# Patient Record
Sex: MALE | Race: WHITE | NOT HISPANIC OR LATINO | RURAL
[De-identification: names, ages, dates, MRNs, and addresses within clinical notes are randomized per-mention and may not be internally consistent; named-entity substitution may affect disease eponyms.]

---

## 2017-02-12 ENCOUNTER — OFFICE VISIT (OUTPATIENT)
Dept: RETAIL CLINIC | Facility: CLINIC | Age: 32
End: 2017-02-12

## 2017-02-12 VITALS
HEIGHT: 71 IN | BODY MASS INDEX: 34.3 KG/M2 | TEMPERATURE: 99.1 F | OXYGEN SATURATION: 98 % | HEART RATE: 96 BPM | WEIGHT: 245 LBS | RESPIRATION RATE: 18 BRPM

## 2017-02-12 DIAGNOSIS — R05.9 COUGH: ICD-10-CM

## 2017-02-12 DIAGNOSIS — R68.89 FLU-LIKE SYMPTOMS: ICD-10-CM

## 2017-02-12 DIAGNOSIS — J01.00 ACUTE MAXILLARY SINUSITIS, RECURRENCE NOT SPECIFIED: Primary | ICD-10-CM

## 2017-02-12 LAB
EXPIRATION DATE: NORMAL
FLUAV AG NPH QL: NORMAL
FLUBV AG NPH QL: NORMAL
INTERNAL CONTROL: NORMAL
Lab: NORMAL

## 2017-02-12 PROCEDURE — 99213 OFFICE O/P EST LOW 20 MIN: CPT | Performed by: NURSE PRACTITIONER

## 2017-02-12 PROCEDURE — 87804 INFLUENZA ASSAY W/OPTIC: CPT | Performed by: NURSE PRACTITIONER

## 2017-02-12 RX ORDER — AMOXICILLIN AND CLAVULANATE POTASSIUM 875; 125 MG/1; MG/1
1 TABLET, FILM COATED ORAL 2 TIMES DAILY
Qty: 20 TABLET | Refills: 0 | Status: SHIPPED | OUTPATIENT
Start: 2017-02-12 | End: 2017-02-22

## 2017-02-12 RX ORDER — DEXTROMETHORPHAN HYDROBROMIDE AND PROMETHAZINE HYDROCHLORIDE 15; 6.25 MG/5ML; MG/5ML
5 SYRUP ORAL 4 TIMES DAILY PRN
Qty: 240 ML | Refills: 0 | Status: SHIPPED | OUTPATIENT
Start: 2017-02-12 | End: 2018-01-30 | Stop reason: SDUPTHER

## 2017-02-12 NOTE — PROGRESS NOTES
Subjective   Lele Yu is a 31 y.o. male.     Sinus Problem   This is a new problem. The current episode started in the past 7 days. The problem has been gradually worsening since onset. There has been no fever. The pain is moderate. Associated symptoms include chills, congestion, coughing (persistent), headaches, a hoarse voice, sinus pressure and swollen glands. Pertinent negatives include no ear pain, neck pain, shortness of breath, sneezing or sore throat. Past treatments include acetaminophen. The treatment provided no relief.   Flu Symptoms   This is a new problem. The current episode started yesterday. The problem occurs constantly. The problem has been gradually worsening. Associated symptoms include anorexia, chills, congestion, coughing (persistent), headaches, myalgias, nausea and swollen glands. Pertinent negatives include no abdominal pain, chest pain, fatigue, fever, neck pain, rash, sore throat or vomiting. The symptoms are aggravated by coughing. He has tried NSAIDs and acetaminophen for the symptoms. The treatment provided no relief.        The following portions of the patient's history were reviewed and updated as appropriate: allergies, current medications, past family history, past medical history, past social history, past surgical history and problem list.    Review of Systems   Constitutional: Positive for appetite change and chills. Negative for fatigue and fever.   HENT: Positive for congestion, hoarse voice, postnasal drip, rhinorrhea and sinus pressure. Negative for ear pain, sneezing, sore throat and trouble swallowing.    Eyes: Negative.    Respiratory: Positive for cough (persistent). Negative for shortness of breath and wheezing.    Cardiovascular: Negative.  Negative for chest pain.   Gastrointestinal: Positive for anorexia and nausea. Negative for abdominal pain, diarrhea and vomiting.   Musculoskeletal: Positive for myalgias. Negative for neck pain.   Skin: Negative.  Negative  "for rash.   Neurological: Positive for headaches. Negative for dizziness.        Visit Vitals   • Pulse 96   • Temp 99.1 °F (37.3 °C)   • Resp 18   • Ht 71\" (180.3 cm)   • Wt 245 lb (111 kg)   • SpO2 98%   • BMI 34.17 kg/m2        Objective   Physical Exam   Constitutional: Vital signs are normal. He appears well-developed and well-nourished.   HENT:   Head: Normocephalic.   Right Ear: External ear and ear canal normal. No drainage, swelling or tenderness. Tympanic membrane is bulging. Tympanic membrane is not erythematous.   Left Ear: External ear and ear canal normal. No drainage, swelling or tenderness. Tympanic membrane is bulging. Tympanic membrane is not erythematous.   Nose: Mucosal edema and rhinorrhea present. Right sinus exhibits maxillary sinus tenderness and frontal sinus tenderness. Left sinus exhibits maxillary sinus tenderness and frontal sinus tenderness.   Mouth/Throat: Uvula is midline, oropharynx is clear and moist and mucous membranes are normal. Tonsils are 0 on the right. Tonsils are 0 on the left. No tonsillar exudate.   Eyes: Conjunctivae and EOM are normal. Pupils are equal, round, and reactive to light. Right eye exhibits no discharge. Left eye exhibits no discharge.   Neck: Normal range of motion. Neck supple.   Cardiovascular: Normal rate, regular rhythm, S1 normal, S2 normal and normal heart sounds.    Pulmonary/Chest: Effort normal and breath sounds normal. No respiratory distress. He has no wheezes.   Abdominal: Soft. Normal appearance. Bowel sounds are increased. There is no tenderness. There is no rebound and no guarding.   Lymphadenopathy:        Head (right side): Tonsillar adenopathy present.        Head (left side): Tonsillar adenopathy present.     He has no cervical adenopathy.   Skin: Skin is warm, dry and intact. No rash noted. He is not diaphoretic.   Psychiatric: He has a normal mood and affect. His speech is normal and behavior is normal. Thought content normal.   Vitals " reviewed.       Results for orders placed or performed in visit on 02/12/17   POC Influenza A / B   Result Value Ref Range    Rapid Influenza A Ag neg     Rapid Influenza B Ag neg     Internal Control Passed Passed    Lot Number [id number]     Expiration Date 6/2018         Assessment/Plan   Lele was seen today for sinus problem and flu symptoms.    Diagnoses and all orders for this visit:    Acute maxillary sinusitis, recurrence not specified  -     amoxicillin-clavulanate (AUGMENTIN) 875-125 MG per tablet; Take 1 tablet by mouth 2 (Two) Times a Day for 10 days.    Flu-like symptoms  -     POC Influenza A / B    Cough  -     promethazine-dextromethorphan (PROMETHAZINE-DM) 6.25-15 MG/5ML syrup; Take 5 mL by mouth 4 (Four) Times a Day As Needed for cough.

## 2017-02-12 NOTE — PATIENT INSTRUCTIONS
Sinusitis, Adult  Sinusitis is redness, soreness, and inflammation of the paranasal sinuses. Paranasal sinuses are air pockets within the bones of your face. They are located beneath your eyes, in the middle of your forehead, and above your eyes. In healthy paranasal sinuses, mucus is able to drain out, and air is able to circulate through them by way of your nose. However, when your paranasal sinuses are inflamed, mucus and air can become trapped. This can allow bacteria and other germs to grow and cause infection.  Sinusitis can develop quickly and last only a short time (acute) or continue over a long period (chronic). Sinusitis that lasts for more than 12 weeks is considered chronic.  CAUSES  Causes of sinusitis include:  · Allergies.  · Structural abnormalities, such as displacement of the cartilage that separates your nostrils (deviated septum), which can decrease the air flow through your nose and sinuses and affect sinus drainage.  · Functional abnormalities, such as when the small hairs (cilia) that line your sinuses and help remove mucus do not work properly or are not present.  SIGNS AND SYMPTOMS  Symptoms of acute and chronic sinusitis are the same. The primary symptoms are pain and pressure around the affected sinuses. Other symptoms include:  · Upper toothache.  · Earache.  · Headache.  · Bad breath.  · Decreased sense of smell and taste.  · A cough, which worsens when you are lying flat.  · Fatigue.  · Fever.  · Thick drainage from your nose, which often is green and may contain pus (purulent).  · Swelling and warmth over the affected sinuses.  DIAGNOSIS  Your health care provider will perform a physical exam. During your exam, your health care provider may perform any of the following to help determine if you have acute sinusitis or chronic sinusitis:  · Look in your nose for signs of abnormal growths in your nostrils (nasal polyps).  · Tap over the affected sinus to check for signs of  infection.  · View the inside of your sinuses using an imaging device that has a light attached (endoscope).  If your health care provider suspects that you have chronic sinusitis, one or more of the following tests may be recommended:  · Allergy tests.  · Nasal culture. A sample of mucus is taken from your nose, sent to a lab, and screened for bacteria.  · Nasal cytology. A sample of mucus is taken from your nose and examined by your health care provider to determine if your sinusitis is related to an allergy.  TREATMENT  Most cases of acute sinusitis are related to a viral infection and will resolve on their own within 10 days. Sometimes, medicines are prescribed to help relieve symptoms of both acute and chronic sinusitis. These may include pain medicines, decongestants, nasal steroid sprays, or saline sprays.  However, for sinusitis related to a bacterial infection, your health care provider will prescribe antibiotic medicines. These are medicines that will help kill the bacteria causing the infection.  Rarely, sinusitis is caused by a fungal infection. In these cases, your health care provider will prescribe antifungal medicine.  For some cases of chronic sinusitis, surgery is needed. Generally, these are cases in which sinusitis recurs more than 3 times per year, despite other treatments.  HOME CARE INSTRUCTIONS  · Drink plenty of water. Water helps thin the mucus so your sinuses can drain more easily.  · Use a humidifier.  · Inhale steam 3-4 times a day (for example, sit in the bathroom with the shower running).  · Apply a warm, moist washcloth to your face 3-4 times a day, or as directed by your health care provider.  · Use saline nasal sprays to help moisten and clean your sinuses.  · Take medicines only as directed by your health care provider.  · If you were prescribed either an antibiotic or antifungal medicine, finish it all even if you start to feel better.  SEEK IMMEDIATE MEDICAL CARE IF:  · You have  "increasing pain or severe headaches.  · You have nausea, vomiting, or drowsiness.  · You have swelling around your face.  · You have vision problems.  · You have a stiff neck.  · You have difficulty breathing.     This information is not intended to replace advice given to you by your health care provider. Make sure you discuss any questions you have with your health care provider.     Document Released: 12/18/2006 Document Revised: 01/08/2016 Document Reviewed: 01/01/2013  [employer] Interactive Patient Education ©2016 [employer]  Viral Infections  A viral infection can be caused by different types of viruses. Most viral infections are not serious and resolve on their own. However, some infections may cause severe symptoms and may lead to further complications.  SYMPTOMS  Viruses can frequently cause:  · Minor sore throat.  · Aches and pains.  · Headaches.  · Runny nose.  · Different types of rashes.  · Watery eyes.  · Tiredness.  · Cough.  · Loss of appetite.  · Gastrointestinal infections, resulting in nausea, vomiting, and diarrhea.  These symptoms do not respond to antibiotics because the infection is not caused by bacteria. However, you might catch a bacterial infection following the viral infection. This is sometimes called a \"superinfection.\" Symptoms of such a bacterial infection may include:  · Worsening sore throat with pus and difficulty swallowing.  · Swollen neck glands.  · Chills and a high or persistent fever.  · Severe headache.  · Tenderness over the sinuses.  · Persistent overall ill feeling (malaise), muscle aches, and tiredness (fatigue).  · Persistent cough.  · Yellow, green, or brown mucus production with coughing.  HOME CARE INSTRUCTIONS   · Only take over-the-counter or prescription medicines for pain, discomfort, diarrhea, or fever as directed by your caregiver.  · Drink enough water and fluids to keep your urine clear or pale yellow. Sports drinks can provide valuable electrolytes, " sugars, and hydration.  · Get plenty of rest and maintain proper nutrition. Soups and broths with crackers or rice are fine.  SEEK IMMEDIATE MEDICAL CARE IF:   · You have severe headaches, shortness of breath, chest pain, neck pain, or an unusual rash.  · You have uncontrolled vomiting, diarrhea, or you are unable to keep down fluids.  · You or your child has an oral temperature above 102° F (38.9° C), not controlled by medicine.  · Your baby is older than 3 months with a rectal temperature of 102° F (38.9° C) or higher.  · Your baby is 3 months old or younger with a rectal temperature of 100.4° F (38° C) or higher.  MAKE SURE YOU:   · Understand these instructions.  · Will watch your condition.  · Will get help right away if you are not doing well or get worse.     This information is not intended to replace advice given to you by your health care provider. Make sure you discuss any questions you have with your health care provider.     Document Released: 09/27/2006 Document Revised: 03/11/2013 Document Reviewed: 05/25/2016  [employer] Interactive Patient Education ©2016 [employer]

## 2018-01-30 ENCOUNTER — OFFICE VISIT (OUTPATIENT)
Dept: RETAIL CLINIC | Facility: CLINIC | Age: 33
End: 2018-01-30

## 2018-01-30 VITALS
TEMPERATURE: 98.1 F | BODY MASS INDEX: 34.07 KG/M2 | HEART RATE: 101 BPM | RESPIRATION RATE: 20 BRPM | WEIGHT: 238 LBS | OXYGEN SATURATION: 98 % | HEIGHT: 70 IN

## 2018-01-30 DIAGNOSIS — J00 COMMON COLD: ICD-10-CM

## 2018-01-30 DIAGNOSIS — R05.9 COUGH: ICD-10-CM

## 2018-01-30 DIAGNOSIS — J01.01 ACUTE RECURRENT MAXILLARY SINUSITIS: Primary | ICD-10-CM

## 2018-01-30 PROCEDURE — 99213 OFFICE O/P EST LOW 20 MIN: CPT | Performed by: NURSE PRACTITIONER

## 2018-01-30 PROCEDURE — 87804 INFLUENZA ASSAY W/OPTIC: CPT | Performed by: NURSE PRACTITIONER

## 2018-01-30 RX ORDER — AMOXICILLIN AND CLAVULANATE POTASSIUM 875; 125 MG/1; MG/1
1 TABLET, FILM COATED ORAL EVERY 12 HOURS SCHEDULED
Qty: 20 TABLET | Refills: 0 | Status: SHIPPED | OUTPATIENT
Start: 2018-01-30 | End: 2018-02-09

## 2018-01-30 RX ORDER — PREDNISONE 10 MG/1
TABLET ORAL DAILY
Qty: 21 EACH | Refills: 0 | Status: SHIPPED | OUTPATIENT
Start: 2018-01-30 | End: 2018-02-05

## 2018-01-30 RX ORDER — DEXTROMETHORPHAN HYDROBROMIDE AND PROMETHAZINE HYDROCHLORIDE 15; 6.25 MG/5ML; MG/5ML
5 SYRUP ORAL 4 TIMES DAILY PRN
Qty: 240 ML | Refills: 0 | Status: SHIPPED | OUTPATIENT
Start: 2018-01-30 | End: 2020-02-08

## 2018-01-30 RX ORDER — PSEUDOEPHEDRINE HCL 120 MG/1
120 TABLET, FILM COATED, EXTENDED RELEASE ORAL EVERY 12 HOURS
Qty: 20 TABLET | Refills: 0 | Status: SHIPPED | OUTPATIENT
Start: 2018-01-30 | End: 2018-02-09

## 2018-01-30 RX ORDER — IBUPROFEN 800 MG/1
800 TABLET ORAL EVERY 8 HOURS PRN
Qty: 90 TABLET | Refills: 0 | Status: SHIPPED | OUTPATIENT
Start: 2018-01-30 | End: 2020-02-08

## 2018-01-30 NOTE — PROGRESS NOTES
"Fina Yu is a 32 y.o. male.     Flu Symptoms   This is a new problem. The current episode started yesterday. The problem occurs constantly. The problem has been rapidly worsening. Associated symptoms include chills, congestion, coughing, fatigue, a fever, headaches (severe), myalgias, neck pain, a sore throat and swollen glands. Pertinent negatives include no arthralgias, nausea or vomiting. The symptoms are aggravated by coughing. He has tried NSAIDs for the symptoms. The treatment provided mild relief.   Sinus Problem   This is a recurrent problem. The current episode started in the past 7 days. The problem has been gradually worsening since onset. The maximum temperature recorded prior to his arrival was 100.4 - 100.9 F. The pain is severe. Associated symptoms include chills, congestion, coughing, headaches (severe), a hoarse voice, neck pain, sinus pressure, a sore throat and swollen glands. Pertinent negatives include no shortness of breath. Past treatments include nothing. The treatment provided no relief.        The following portions of the patient's history were reviewed and updated as appropriate: allergies, current medications, past medical history, past social history, past surgical history and problem list.    Review of Systems   Constitutional: Positive for appetite change, chills, fatigue and fever.   HENT: Positive for congestion, hoarse voice, postnasal drip, rhinorrhea, sinus pain, sinus pressure and sore throat.    Eyes: Negative.    Respiratory: Positive for cough. Negative for chest tightness, shortness of breath and wheezing.    Cardiovascular: Negative.    Gastrointestinal: Negative for diarrhea, nausea and vomiting.   Musculoskeletal: Positive for myalgias and neck pain. Negative for arthralgias.   Skin: Negative.    Neurological: Positive for headaches (severe).   Hematological: Positive for adenopathy.        Pulse 101  Temp 98.1 °F (36.7 °C)  Resp 20  Ht 177.8 cm (70\") "  Wt 108 kg (238 lb)  SpO2 98%  BMI 34.15 kg/m2     Objective   Physical Exam   Constitutional: He is oriented to person, place, and time. Vital signs are normal. He appears well-developed and well-nourished.   HENT:   Head: Normocephalic.   Right Ear: External ear and ear canal normal. No drainage, swelling or tenderness. Tympanic membrane is bulging. Tympanic membrane is not erythematous.   Left Ear: External ear and ear canal normal. No drainage, swelling or tenderness. Tympanic membrane is bulging. Tympanic membrane is not erythematous.   Nose: Mucosal edema and rhinorrhea present. Right sinus exhibits maxillary sinus tenderness and frontal sinus tenderness. Left sinus exhibits maxillary sinus tenderness and frontal sinus tenderness.   Mouth/Throat: Uvula is midline, oropharynx is clear and moist and mucous membranes are normal. Tonsils are 0 on the right. Tonsils are 0 on the left. No tonsillar exudate.   Eyes: Conjunctivae and EOM are normal. Pupils are equal, round, and reactive to light. Right eye exhibits no discharge. Left eye exhibits no discharge.   Neck: Normal range of motion. Neck supple.   Cardiovascular: Normal rate, regular rhythm, S1 normal, S2 normal and normal heart sounds.    Pulmonary/Chest: Effort normal and breath sounds normal. No respiratory distress. He has no wheezes.   Lymphadenopathy:        Head (right side): Tonsillar adenopathy present.        Head (left side): Tonsillar adenopathy present.     He has no cervical adenopathy.   Neurological: He is alert and oriented to person, place, and time.   Skin: Skin is warm, dry and intact. No rash noted. He is not diaphoretic.   Psychiatric: He has a normal mood and affect. His speech is normal and behavior is normal. Thought content normal.   Vitals reviewed.       Results for orders placed or performed in visit on 01/30/18   POC Influenza A / B   Result Value Ref Range    Rapid Influenza A Ag neg     Rapid Influenza B Ag neg     Internal  Control Passed Passed    Lot Number [id number]     Expiration Date 12/2019         Assessment/Plan   Lele was seen today for flu symptoms and sinus problem.    Diagnoses and all orders for this visit:    Acute recurrent maxillary sinusitis  -     amoxicillin-clavulanate (AUGMENTIN) 875-125 MG per tablet; Take 1 tablet by mouth Every 12 (Twelve) Hours for 10 days.  -     pseudoephedrine (SUDAFED) 120 MG 12 hr tablet; Take 1 tablet by mouth Every 12 (Twelve) Hours for 10 days.  -     PredniSONE (DELTASONE) 10 MG (21) tablet pack; Take  by mouth Daily for 6 days. Use as directed on package    Common cold  -     POC Influenza A / B  -     ibuprofen (ADVIL,MOTRIN) 800 MG tablet; Take 1 tablet by mouth Every 8 (Eight) Hours As Needed for Mild Pain .    Cough  -     promethazine-dextromethorphan (PROMETHAZINE-DM) 6.25-15 MG/5ML syrup; Take 5 mL by mouth 4 (Four) Times a Day As Needed for Cough.

## 2018-01-30 NOTE — PATIENT INSTRUCTIONS
Sinusitis, Adult  Sinusitis is soreness and inflammation of your sinuses. Sinuses are hollow spaces in the bones around your face. Your sinuses are located:  · Around your eyes.  · In the middle of your forehead.  · Behind your nose.  · In your cheekbones.  Your sinuses and nasal passages are lined with a stringy fluid (mucus). Mucus normally drains out of your sinuses. When your nasal tissues become inflamed or swollen, the mucus can become trapped or blocked so air cannot flow through your sinuses. This allows bacteria, viruses, and funguses to grow, which leads to infection.  Sinusitis can develop quickly and last for 7?10 days (acute) or for more than 12 weeks (chronic). Sinusitis often develops after a cold.  What are the causes?  This condition is caused by anything that creates swelling in the sinuses or stops mucus from draining, including:  · Allergies.  · Asthma.  · Bacterial or viral infection.  · Abnormally shaped bones between the nasal passages.  · Nasal growths that contain mucus (nasal polyps).  · Narrow sinus openings.  · Pollutants, such as chemicals or irritants in the air.  · A foreign object stuck in the nose.  · A fungal infection. This is rare.  What increases the risk?  The following factors may make you more likely to develop this condition:  · Having allergies or asthma.  · Having had a recent cold or respiratory tract infection.  · Having structural deformities or blockages in your nose or sinuses.  · Having a weak immune system.  · Doing a lot of swimming or diving.  · Overusing nasal sprays.  · Smoking.  What are the signs or symptoms?  The main symptoms of this condition are pain and a feeling of pressure around the affected sinuses. Other symptoms include:  · Upper toothache.  · Earache.  · Headache.  · Bad breath.  · Decreased sense of smell and taste.  · A cough that may get worse at night.  · Fatigue.  · Fever.  · Thick drainage from your nose. The drainage is often green and it may  contain pus (purulent).  · Stuffy nose or congestion.  · Postnasal drip. This is when extra mucus collects in the throat or back of the nose.  · Swelling and warmth over the affected sinuses.  · Sore throat.  · Sensitivity to light.  How is this diagnosed?  This condition is diagnosed based on symptoms, a medical history, and a physical exam. To find out if your condition is acute or chronic, your health care provider may:  · Look in your nose for signs of nasal polyps.  · Tap over the affected sinus to check for signs of infection.  · View the inside of your sinuses using an imaging device that has a light attached (endoscope).  If your health care provider suspects that you have chronic sinusitis, you may also:  · Be tested for allergies.  · Have a sample of mucus taken from your nose (nasal culture) and checked for bacteria.  · Have a mucus sample examined to see if your sinusitis is related to an allergy.  If your sinusitis does not respond to treatment and it lasts longer than 8 weeks, you may have an MRI or CT scan to check your sinuses. These scans also help to determine how severe your infection is.  In rare cases, a bone biopsy may be done to rule out more serious types of fungal sinus disease.  How is this treated?  Treatment for sinusitis depends on the cause and whether your condition is chronic or acute. If a virus is causing your sinusitis, your symptoms will go away on their own within 10 days. You may be given medicines to relieve your symptoms, including:  · Topical nasal decongestants. They shrink swollen nasal passages and let mucus drain from your sinuses.  · Antihistamines. These drugs block inflammation that is triggered by allergies. This can help to ease swelling in your nose and sinuses.  · Topical nasal corticosteroids. These are nasal sprays that ease inflammation and swelling in your nose and sinuses.  · Nasal saline washes. These rinses can help to get rid of thick mucus in your  nose.  If your condition is caused by bacteria, you will be given an antibiotic medicine. If your condition is caused by a fungus, you will be given an antifungal medicine.  Surgery may be needed to correct underlying conditions, such as narrow nasal passages. Surgery may also be needed to remove polyps.  Follow these instructions at home:  Medicines  · Take, use, or apply over-the-counter and prescription medicines only as told by your health care provider. These may include nasal sprays.  · If you were prescribed an antibiotic medicine, take it as told by your health care provider. Do not stop taking the antibiotic even if you start to feel better.  Hydrate and Humidify  · Drink enough water to keep your urine clear or pale yellow. Staying hydrated will help to thin your mucus.  · Use a cool mist humidifier to keep the humidity level in your home above 50%.  · Inhale steam for 10-15 minutes, 3-4 times a day or as told by your health care provider. You can do this in the bathroom while a hot shower is running.  · Limit your exposure to cool or dry air.  Rest  · Rest as much as possible.  · Sleep with your head raised (elevated).  · Make sure to get enough sleep each night.  General instructions  · Apply a warm, moist washcloth to your face 3-4 times a day or as told by your health care provider. This will help with discomfort.  · Wash your hands often with soap and water to reduce your exposure to viruses and other germs. If soap and water are not available, use hand .  · Do not smoke. Avoid being around people who are smoking (secondhand smoke).  · Keep all follow-up visits as told by your health care provider. This is important.  Contact a health care provider if:  · You have a fever.  · Your symptoms get worse.  · Your symptoms do not improve within 10 days.  Get help right away if:  · You have a severe headache.  · You have persistent vomiting.  · You have pain or swelling around your face or  eyes.  · You have vision problems.  · You develop confusion.  · Your neck is stiff.  · You have trouble breathing.  This information is not intended to replace advice given to you by your health care provider. Make sure you discuss any questions you have with your health care provider.  Document Released: 12/18/2006 Document Revised: 08/13/2017 Document Reviewed: 10/12/2016  [employer] Interactive Patient Education © 2017 [employer].    Viral Respiratory Infection  A respiratory infection is an illness that affects part of the respiratory system, such as the lungs, nose, or throat. Most respiratory infections are caused by either viruses or bacteria. A respiratory infection that is caused by a virus is called a viral respiratory infection.  Common types of viral respiratory infections include:  · A cold.  · The flu (influenza).  · A respiratory syncytial virus (RSV) infection.  How do I know if I have a viral respiratory infection?  Most viral respiratory infections cause:  · A stuffy or runny nose.  · Yellow or green nasal discharge.  · A cough.  · Sneezing.  · Fatigue.  · Achy muscles.  · A sore throat.  · Sweating or chills.  · A fever.  · A headache.  How are viral respiratory infections treated?  If influenza is diagnosed early, it may be treated with an antiviral medicine that shortens the length of time a person has symptoms. Symptoms of viral respiratory infections may be treated with over-the-counter and prescription medicines, such as:  · Expectorants. These make it easier to cough up mucus.  · Decongestant nasal sprays.  Health care providers do not prescribe antibiotic medicines for viral infections. This is because antibiotics are designed to kill bacteria. They have no effect on viruses.  How do I know if I should stay home from work or school?  To avoid exposing others to your respiratory infection, stay home if you have:  · A fever.  · A persistent cough.  · A sore throat.  · A runny  nose.  · Sneezing.  · Muscles aches.  · Headaches.  · Fatigue.  · Weakness.  · Chills.  · Sweating.  · Nausea.  Follow these instructions at home:  · Rest as much as possible.  · Take over-the-counter and prescription medicines only as told by your health care provider.  · Drink enough fluid to keep your urine clear or pale yellow. This helps prevent dehydration and helps loosen up mucus.  · Gargle with a salt-water mixture 3-4 times per day or as needed. To make a salt-water mixture, completely dissolve ½-1 tsp of salt in 1 cup of warm water.  · Use nose drops made from salt water to ease congestion and soften raw skin around your nose.  · Do not drink alcohol.  · Do not use tobacco products, including cigarettes, chewing tobacco, and e-cigarettes. If you need help quitting, ask your health care provider.  Contact a health care provider if:  · Your symptoms last for 10 days or longer.  · Your symptoms get worse over time.  · You have a fever.  · You have severe sinus pain in your face or forehead.  · The glands in your jaw or neck become very swollen.  Get help right away if:  · You feel pain or pressure in your chest.  · You have shortness of breath.  · You faint or feel like you will faint.  · You have severe and persistent vomiting.  · You feel confused or disoriented.  This information is not intended to replace advice given to you by your health care provider. Make sure you discuss any questions you have with your health care provider.  Document Released: 09/27/2006 Document Revised: 05/25/2017 Document Reviewed: 05/25/2016  [employer] Interactive Patient Education © 2017 [employer]

## 2020-02-08 ENCOUNTER — OFFICE VISIT (OUTPATIENT)
Dept: RETAIL CLINIC | Facility: CLINIC | Age: 35
End: 2020-02-08

## 2020-02-08 VITALS
DIASTOLIC BLOOD PRESSURE: 76 MMHG | SYSTOLIC BLOOD PRESSURE: 118 MMHG | TEMPERATURE: 101.9 F | HEIGHT: 70 IN | WEIGHT: 257 LBS | HEART RATE: 129 BPM | BODY MASS INDEX: 36.79 KG/M2 | OXYGEN SATURATION: 94 % | RESPIRATION RATE: 16 BRPM

## 2020-02-08 DIAGNOSIS — J11.1 INFLUENZA: Primary | ICD-10-CM

## 2020-02-08 DIAGNOSIS — J45.901 INTRINSIC ASTHMA WITH EXACERBATION, UNSPECIFIED ASTHMA SEVERITY: ICD-10-CM

## 2020-02-08 LAB
EXPIRATION DATE: NORMAL
FLUAV AG NPH QL: NEGATIVE
FLUBV AG NPH QL: NEGATIVE
INTERNAL CONTROL: NORMAL
Lab: NORMAL

## 2020-02-08 PROCEDURE — 99213 OFFICE O/P EST LOW 20 MIN: CPT | Performed by: NURSE PRACTITIONER

## 2020-02-08 PROCEDURE — 94640 AIRWAY INHALATION TREATMENT: CPT | Performed by: NURSE PRACTITIONER

## 2020-02-08 PROCEDURE — 87804 INFLUENZA ASSAY W/OPTIC: CPT | Performed by: NURSE PRACTITIONER

## 2020-02-08 RX ORDER — ONDANSETRON 4 MG/1
4 TABLET, ORALLY DISINTEGRATING ORAL EVERY 6 HOURS PRN
Qty: 8 TABLET | Refills: 0 | Status: SHIPPED | OUTPATIENT
Start: 2020-02-08

## 2020-02-08 RX ORDER — IPRATROPIUM BROMIDE AND ALBUTEROL SULFATE 2.5; .5 MG/3ML; MG/3ML
3 SOLUTION RESPIRATORY (INHALATION) ONCE
Status: COMPLETED | OUTPATIENT
Start: 2020-02-08 | End: 2020-02-08

## 2020-02-08 RX ORDER — ALBUTEROL SULFATE 90 UG/1
2 AEROSOL, METERED RESPIRATORY (INHALATION) EVERY 4 HOURS PRN
Qty: 1 INHALER | Refills: 0 | Status: SHIPPED | OUTPATIENT
Start: 2020-02-08

## 2020-02-08 RX ORDER — DEXTROMETHORPHAN HYDROBROMIDE AND PROMETHAZINE HYDROCHLORIDE 15; 6.25 MG/5ML; MG/5ML
5 SYRUP ORAL NIGHTLY PRN
Qty: 180 ML | Refills: 0 | Status: SHIPPED | OUTPATIENT
Start: 2020-02-08

## 2020-02-08 RX ORDER — PREDNISONE 20 MG/1
TABLET ORAL
Qty: 4 TABLET | Refills: 0 | Status: SHIPPED | OUTPATIENT
Start: 2020-02-08

## 2020-02-08 RX ADMIN — IPRATROPIUM BROMIDE AND ALBUTEROL SULFATE 3 ML: 2.5; .5 SOLUTION RESPIRATORY (INHALATION) at 09:35

## 2020-02-08 NOTE — PATIENT INSTRUCTIONS
"Influenza, Adult  Influenza is also called \"the flu.\" It is an infection in the lungs, nose, and throat (respiratory tract). It is caused by a virus. The flu causes symptoms that are similar to symptoms of a cold. It also causes a high fever and body aches.  The flu spreads easily from person to person (is contagious). Getting a flu shot (influenza vaccination) every year is the best way to prevent the flu.  What are the causes?  This condition is caused by the influenza virus. You can get the virus by:  · Breathing in droplets that are in the air from the cough or sneeze of a person who has the virus.  · Touching something that has the virus on it (is contaminated) and then touching your mouth, nose, or eyes.  What increases the risk?  Certain things may make you more likely to get the flu. These include:  · Not washing your hands often.  · Having close contact with many people during cold and flu season.  · Touching your mouth, eyes, or nose without first washing your hands.  · Not getting a flu shot every year.  You may have a higher risk for the flu, along with serious problems such as a lung infection (pneumonia), if you:  · Are older than 65.  · Are pregnant.  · Have a weakened disease-fighting system (immune system) because of a disease or taking certain medicines.  · Have a long-term (chronic) illness, such as:  ? Heart, kidney, or lung disease.  ? Diabetes.  ? Asthma.  · Have a liver disorder.  · Are very overweight (morbidly obese).  · Have anemia. This is a condition that affects your red blood cells.  What are the signs or symptoms?  Symptoms usually begin suddenly and last 4-14 days. They may include:  · Fever and chills.  · Headaches, body aches, or muscle aches.  · Sore throat.  · Cough.  · Runny or stuffy (congested) nose.  · Chest discomfort.  · Not wanting to eat as much as normal (poor appetite).  · Weakness or feeling tired (fatigue).  · Dizziness.  · Feeling sick to your stomach (nauseous) or " throwing up (vomiting).  How is this treated?  If the flu is found early, you can be treated with medicine that can help reduce how bad the illness is and how long it lasts (antiviral medicine). This may be given by mouth (orally) or through an IV tube.  Taking care of yourself at home can help your symptoms get better. Your doctor may suggest:  · Taking over-the-counter medicines.  · Drinking plenty of fluids.  The flu often goes away on its own. If you have very bad symptoms or other problems, you may be treated in a hospital.  Follow these instructions at home:         Activity  · Rest as needed. Get plenty of sleep.  · Stay home from work or school as told by your doctor.  ? Do not leave home until you do not have a fever for 24 hours without taking medicine.  ? Leave home only to visit your doctor.  Eating and drinking  · Take an ORS (oral rehydration solution). This is a drink that is sold at pharmacies and stores.  · Drink enough fluid to keep your pee (urine) pale yellow.  · Drink clear fluids in small amounts as you are able. Clear fluids include:  ? Water.  ? Ice chips.  ? Fruit juice that has water added (diluted fruit juice).  ? Low-calorie sports drinks.  · Eat bland, easy-to-digest foods in small amounts as you are able. These foods include:  ? Bananas.  ? Applesauce.  ? Rice.  ? Lean meats.  ? Toast.  ? Crackers.  · Do not eat or drink:  ? Fluids that have a lot of sugar or caffeine.  ? Alcohol.  ? Spicy or fatty foods.  General instructions  · Take over-the-counter and prescription medicines only as told by your doctor.  · Use a cool mist humidifier to add moisture to the air in your home. This can make it easier for you to breathe.  · Cover your mouth and nose when you cough or sneeze.  · Wash your hands with soap and water often, especially after you cough or sneeze. If you cannot use soap and water, use alcohol-based hand .  · Keep all follow-up visits as told by your doctor. This is  "important.  How is this prevented?    · Get a flu shot every year. You may get the flu shot in late summer, fall, or winter. Ask your doctor when you should get your flu shot.  · Avoid contact with people who are sick during fall and winter (cold and flu season).  Contact a doctor if:  · You get new symptoms.  · You have:  ? Chest pain.  ? Watery poop (diarrhea).  ? A fever.  · Your cough gets worse.  · You start to have more mucus.  · You feel sick to your stomach.  · You throw up.  Get help right away if you:  · Have shortness of breath.  · Have trouble breathing.  · Have skin or nails that turn a bluish color.  · Have very bad pain or stiffness in your neck.  · Get a sudden headache.  · Get sudden pain in your face or ear.  · Cannot eat or drink without throwing up.  Summary  · Influenza (\"the flu\") is an infection in the lungs, nose, and throat. It is caused by a virus.  · Take over-the-counter and prescription medicines only as told by your doctor.  · Getting a flu shot every year is the best way to avoid getting the flu.  This information is not intended to replace advice given to you by your health care provider. Make sure you discuss any questions you have with your health care provider.  Document Released: 09/26/2009 Document Revised: 06/05/2019 Document Reviewed: 06/05/2019  [employer] Interactive Patient Education © 2019 [employer]    "

## 2020-02-08 NOTE — PROGRESS NOTES
Subjective   Cough    Lele Yu is a 34 y.o. male with h/o asthma, who presents with cough and malaise     Cough   This is a new problem. The current episode started yesterday. The problem has been unchanged. The problem occurs constantly. The cough is non-productive. Associated symptoms include chills, a fever, headaches, myalgias, nasal congestion, a sore throat, shortness of breath, sweats and wheezing. Pertinent negatives include no chest pain, ear pain, heartburn, hemoptysis, rash, rhinorrhea or weight loss. The symptoms are aggravated by lying down and exercise. Risk factors for lung disease include smoking/tobacco exposure. He has tried nothing for the symptoms. His past medical history is significant for asthma and pneumonia.        History Obtained from: Patient    Past Medical History:   Diagnosis Date   • Asthma    • Pneumonia      Past Surgical History:   Procedure Laterality Date   • CYST REMOVAL Left    • HYDROCELE EXCISION / REPAIR Left    • KNEE ARTHROSCOPY Left    • KNEE SURGERY Left     arthroscopy   • THYROID CYST EXCISION       Social History     Tobacco Use   • Smoking status: Current Every Day Smoker     Packs/day: 1.00     Years: 15.00     Pack years: 15.00     Types: Cigarettes   • Smokeless tobacco: Current User   Substance Use Topics   • Alcohol use: Not on file     Comment: denies   • Drug use: Defer     Family History   Problem Relation Age of Onset   • No Known Problems Mother    • No Known Problems Father      No Known Allergies  Current Outpatient Medications   Medication Sig Dispense Refill   • albuterol sulfate  (90 Base) MCG/ACT inhaler Inhale 2 puffs Every 4 (Four) Hours As Needed for Wheezing. 1 inhaler 0   • Baloxavir Marboxil,80 MG Dose, (XOFLUZA) 2 x 40 MG tablet therapy pack Take 2 tablets by mouth 1 (One) Time for 1 dose. 1 each 0   • ondansetron ODT (ZOFRAN ODT) 4 MG disintegrating tablet Take 1 tablet by mouth Every 6 (Six) Hours As Needed for Nausea or Vomiting.  "8 tablet 0   • predniSONE (DELTASONE) 20 MG tablet Take 2 tab po now, then 1 tab po on 2/9/20, 1/2 tab po on 2/10/20, 2/11/20 4 tablet 0   • promethazine-dextromethorphan (PROMETHAZINE-DM) 6.25-15 MG/5ML syrup Take 5 mL by mouth At Night As Needed for Cough. 180 mL 0     No current facility-administered medications for this visit.         The following portions of the patient's history were reviewed and updated as appropriate: allergies, current medications, past family history, past medical history, past social history and past surgical history.    Review of Systems   Constitutional: Positive for appetite change, chills, diaphoresis, fatigue and fever. Negative for weight loss.   HENT: Positive for congestion and sore throat. Negative for ear pain, rhinorrhea, sneezing, trouble swallowing and voice change.    Eyes: Negative.    Respiratory: Positive for cough, shortness of breath and wheezing. Negative for hemoptysis and chest tightness.    Cardiovascular: Negative for chest pain and palpitations.   Gastrointestinal: Positive for nausea. Negative for abdominal pain, diarrhea, heartburn and vomiting.   Musculoskeletal: Positive for myalgias. Negative for neck pain and neck stiffness.   Skin: Negative for rash and wound.   Allergic/Immunologic: Negative for immunocompromised state.   Neurological: Positive for light-headedness and headaches. Negative for dizziness.   Hematological: Negative.    Psychiatric/Behavioral: Positive for sleep disturbance. Negative for hallucinations.       Objective     VITAL SIGNS:   Vitals:    02/08/20 0921 02/08/20 0934   BP: 118/76    Pulse: (!) 129    Resp: 16    Temp: 99.5 °F (37.5 °C) (!) 101.9 °F (38.8 °C)   SpO2: 94%    Weight: 117 kg (257 lb)    Height: 177.8 cm (70\")    PainSc:   5    PainLoc: Generalized    Body mass index is 36.88 kg/m².    Physical Exam   Constitutional:  Non-toxic appearance. He appears ill. No distress.   HENT:   Head: Atraumatic.   Right Ear: Tympanic " membrane is erythematous.   Left Ear: Tympanic membrane is erythematous.   Nose: Mucosal edema present. No rhinorrhea.   Mouth/Throat: Uvula is midline and mucous membranes are normal. No trismus in the jaw. Uvula swelling present. Posterior oropharyngeal erythema present. No oropharyngeal exudate or posterior oropharyngeal edema. Tonsils are 1+ on the right. Tonsils are 1+ on the left. No tonsillar exudate.   Eyes: Pupils are equal, round, and reactive to light. EOM are normal. No scleral icterus.   Neck: Neck supple.   Cardiovascular: Regular rhythm. Tachycardia present. Exam reveals no friction rub.   No murmur heard.  Pulmonary/Chest: Effort normal. He has wheezes (expiratory).   Musculoskeletal: He exhibits no deformity.   Neurological: He is alert.   Skin: Skin is warm and dry.   Psychiatric: He has a normal mood and affect. He is attentive.       LABS:   Results for orders placed or performed in visit on 01/30/18   POC Influenza A / B   Result Value Ref Range    Rapid Influenza A Ag neg     Rapid Influenza B Ag neg     Internal Control Passed Passed    Lot Number [id number]     Expiration Date 12/2,019        CLINICAL QUALITY MEASURES:      Assessment/Plan     Lele was seen today for cough.    Diagnoses and all orders for this visit:    Influenza  -     Baloxavir Marboxil,80 MG Dose, (XOFLUZA) 2 x 40 MG tablet therapy pack; Take 2 tablets by mouth 1 (One) Time for 1 dose.  -     POCT Influenza A/B    Intrinsic asthma with exacerbation, unspecified asthma severity  -     predniSONE (DELTASONE) 20 MG tablet; Take 2 tab po now, then 1 tab po on 2/9/20, 1/2 tab po on 2/10/20, 2/11/20  -     albuterol sulfate  (90 Base) MCG/ACT inhaler; Inhale 2 puffs Every 4 (Four) Hours As Needed for Wheezing.  -     ipratropium-albuterol (DUO-NEB) nebulizer solution 3 mL    Other orders  -     promethazine-dextromethorphan (PROMETHAZINE-DM) 6.25-15 MG/5ML syrup; Take 5 mL by mouth At Night As Needed for Cough.  -      ondansetron ODT (ZOFRAN ODT) 4 MG disintegrating tablet; Take 1 tablet by mouth Every 6 (Six) Hours As Needed for Nausea or Vomiting.        PLAN: Empiric diagnosis based on clinical history and exam despite neg rapid swab. Discussed smoking cessation importance. Patient should follow up with primary care provider. See sooner if symptoms fail to improve, worsen or for the development of new symptoms that need attention.    The patient voiced understanding and agreement to the patient treatment plan and instructions         MELLISA Nelson

## 2020-11-27 ENCOUNTER — HOSPITAL ENCOUNTER (EMERGENCY)
Age: 35
Discharge: HOME | End: 2020-11-27
Payer: COMMERCIAL

## 2020-11-27 VITALS
RESPIRATION RATE: 16 BRPM | TEMPERATURE: 98.7 F | SYSTOLIC BLOOD PRESSURE: 154 MMHG | DIASTOLIC BLOOD PRESSURE: 84 MMHG | OXYGEN SATURATION: 97 % | HEART RATE: 119 BPM

## 2020-11-27 VITALS
TEMPERATURE: 98.78 F | OXYGEN SATURATION: 97 % | SYSTOLIC BLOOD PRESSURE: 154 MMHG | RESPIRATION RATE: 16 BRPM | DIASTOLIC BLOOD PRESSURE: 84 MMHG | HEART RATE: 119 BPM

## 2020-11-27 VITALS — BODY MASS INDEX: 35.2 KG/M2

## 2020-11-27 DIAGNOSIS — F17.210: ICD-10-CM

## 2020-11-27 DIAGNOSIS — Z20.828: Primary | ICD-10-CM

## 2020-11-27 DIAGNOSIS — J20.9: ICD-10-CM

## 2020-11-27 PROCEDURE — 87804 INFLUENZA ASSAY W/OPTIC: CPT

## 2020-11-27 PROCEDURE — 99201: CPT

## 2020-11-27 PROCEDURE — U0003 INFECTIOUS AGENT DETECTION BY NUCLEIC ACID (DNA OR RNA); SEVERE ACUTE RESPIRATORY SYNDROME CORONAVIRUS 2 (SARS-COV-2) (CORONAVIRUS DISEASE [COVID-19]), AMPLIFIED PROBE TECHNIQUE, MAKING USE OF HIGH THROUGHPUT TECHNOLOGIES AS DESCRIBED BY CMS-2020-01-R: HCPCS

## 2020-12-07 ENCOUNTER — HOSPITAL ENCOUNTER (OUTPATIENT)
Age: 35
End: 2020-12-07
Payer: COMMERCIAL

## 2020-12-07 DIAGNOSIS — Z03.818: Primary | ICD-10-CM

## 2020-12-07 PROCEDURE — U0004 COV-19 TEST NON-CDC HGH THRU: HCPCS

## 2020-12-09 LAB — COVID-19 NASAL PCR SENDOUT LEX: NOT DETECTED

## 2021-01-26 ENCOUNTER — HOSPITAL ENCOUNTER (OUTPATIENT)
Age: 36
End: 2021-01-26
Payer: COMMERCIAL

## 2021-01-26 DIAGNOSIS — R22.42: ICD-10-CM

## 2021-01-26 DIAGNOSIS — M79.672: Primary | ICD-10-CM

## 2021-01-26 PROCEDURE — 73630 X-RAY EXAM OF FOOT: CPT

## 2021-02-22 ENCOUNTER — HOSPITAL ENCOUNTER (OUTPATIENT)
Age: 36
End: 2021-02-22
Payer: COMMERCIAL

## 2021-02-22 DIAGNOSIS — R07.9: Primary | ICD-10-CM

## 2021-02-22 DIAGNOSIS — R06.02: ICD-10-CM

## 2021-02-22 LAB
ALBUMIN LEVEL: 5 G/DL (ref 3.5–5)
ALBUMIN/GLOB SERPL: 1.7 {RATIO} (ref 1.1–1.8)
ALP ISO SERPL-ACNC: 103 U/L (ref 38–126)
ALT SERPLBLD-CCNC: 205 U/L (ref 12–78)
ANION GAP SERPL CALC-SCNC: 12.2 MEQ/L (ref 5–15)
AST SERPL QL: 111 U/L (ref 17–59)
BILIRUBIN,TOTAL: 0.6 MG/DL (ref 0.2–1.3)
BUN SERPL-MCNC: 14 MG/DL (ref 9–20)
CALCIUM SPEC-MCNC: 10.4 MG/DL (ref 8.4–10.2)
CHLORIDE SPEC-SCNC: 106 MMOL/L (ref 98–107)
CHOLEST SPEC-SCNC: 246 MG/DL (ref 140–200)
CO2 SERPL-SCNC: 25 MMOL/L (ref 22–30)
CREAT BLD-SCNC: 0.8 MG/DL (ref 0.66–1.25)
ESTIMATED GLOMERULAR FILT RATE: 109 ML/MIN (ref 60–?)
GFR (AFRICAN AMERICAN): 132 ML/MIN (ref 60–?)
GLOBULIN SER CALC-MCNC: 3 G/DL (ref 1.3–3.2)
GLUCOSE: 193 MG/DL (ref 74–100)
HCT VFR BLD CALC: 48.5 % (ref 42–52)
HDLC SERPL-MCNC: 45 MG/DL (ref 40–60)
HGB BLD-MCNC: 16 G/DL (ref 14.1–18)
MAGNESIUM: 1.8 MG/DL (ref 1.6–2.3)
MCHC RBC-ENTMCNC: 33 G/DL (ref 31.8–35.4)
MCV RBC: 91.1 FL (ref 80–94)
MEAN CORPUSCULAR HEMOGLOBIN: 30.1 PG (ref 27–31.2)
PLATELET # BLD: 315 K/MM3 (ref 142–424)
POTASSIUM: 4.2 MMOL/L (ref 3.5–5.1)
PROT SERPL-MCNC: 8 G/DL (ref 6.3–8.2)
RBC # BLD AUTO: 5.32 M/MM3 (ref 4.6–6.2)
SODIUM SPEC-SCNC: 139 MMOL/L (ref 136–145)
TRIGLYCERIDES: 566 MG/DL (ref 30–150)
TROPONIN I: < 0.01 NG/ML (ref 0–0.03)
TSH SERPL-ACNC: 1.85 UIU/ML (ref 0.47–4.68)
WBC # BLD AUTO: 7.9 K/MM3 (ref 4.8–10.8)

## 2021-02-22 PROCEDURE — 84484 ASSAY OF TROPONIN QUANT: CPT

## 2021-02-22 PROCEDURE — 93005 ELECTROCARDIOGRAM TRACING: CPT

## 2021-02-22 PROCEDURE — 71046 X-RAY EXAM CHEST 2 VIEWS: CPT

## 2021-02-22 PROCEDURE — 36415 COLL VENOUS BLD VENIPUNCTURE: CPT

## 2021-02-22 PROCEDURE — 80061 LIPID PANEL: CPT

## 2021-02-22 PROCEDURE — 85025 COMPLETE CBC W/AUTO DIFF WBC: CPT

## 2021-02-22 PROCEDURE — 84443 ASSAY THYROID STIM HORMONE: CPT

## 2021-02-22 PROCEDURE — 83735 ASSAY OF MAGNESIUM: CPT

## 2021-02-22 PROCEDURE — 80053 COMPREHEN METABOLIC PANEL: CPT

## 2021-02-28 ENCOUNTER — HOSPITAL ENCOUNTER (EMERGENCY)
Age: 36
LOS: 1 days | Discharge: HOME | End: 2021-03-01
Payer: COMMERCIAL

## 2021-02-28 VITALS
SYSTOLIC BLOOD PRESSURE: 164 MMHG | HEART RATE: 97 BPM | RESPIRATION RATE: 19 BRPM | DIASTOLIC BLOOD PRESSURE: 97 MMHG | OXYGEN SATURATION: 96 %

## 2021-02-28 VITALS
OXYGEN SATURATION: 96 % | HEART RATE: 111 BPM | TEMPERATURE: 98.06 F | SYSTOLIC BLOOD PRESSURE: 178 MMHG | BODY MASS INDEX: 39.9 KG/M2 | DIASTOLIC BLOOD PRESSURE: 108 MMHG | RESPIRATION RATE: 19 BRPM

## 2021-02-28 VITALS
OXYGEN SATURATION: 96 % | SYSTOLIC BLOOD PRESSURE: 144 MMHG | DIASTOLIC BLOOD PRESSURE: 93 MMHG | HEART RATE: 98 BPM | RESPIRATION RATE: 17 BRPM

## 2021-02-28 DIAGNOSIS — F17.210: ICD-10-CM

## 2021-02-28 DIAGNOSIS — R07.89: Primary | ICD-10-CM

## 2021-02-28 DIAGNOSIS — I10: ICD-10-CM

## 2021-02-28 DIAGNOSIS — K22.9: ICD-10-CM

## 2021-02-28 LAB
ANION GAP SERPL CALC-SCNC: 11.7 MEQ/L (ref 5–15)
BUN SERPL-MCNC: 14 MG/DL (ref 9–20)
CALCIUM SPEC-MCNC: 10.1 MG/DL (ref 8.4–10.2)
CHLORIDE SPEC-SCNC: 106 MMOL/L (ref 98–107)
CO2 SERPL-SCNC: 26 MMOL/L (ref 22–30)
CREAT BLD-SCNC: 0.8 MG/DL (ref 0.66–1.25)
CREATININE CLEARANCE ESTIMATED: 228 ML/MIN (ref 50–200)
ESTIMATED GLOMERULAR FILT RATE: 109 ML/MIN (ref 60–?)
GFR (AFRICAN AMERICAN): 132 ML/MIN (ref 60–?)
GLUCOSE: 146 MG/DL (ref 74–100)
HCT VFR BLD CALC: 46.3 % (ref 42–52)
HGB BLD-MCNC: 15.7 G/DL (ref 14.1–18)
MCHC RBC-ENTMCNC: 34 G/DL (ref 31.8–35.4)
MCV RBC: 88.4 FL (ref 80–94)
MEAN CORPUSCULAR HEMOGLOBIN: 30 PG (ref 27–31.2)
PLATELET # BLD: 307 K/MM3 (ref 142–424)
POTASSIUM: 3.7 MMOL/L (ref 3.5–5.1)
RBC # BLD AUTO: 5.24 M/MM3 (ref 4.6–6.2)
SODIUM SPEC-SCNC: 140 MMOL/L (ref 136–145)
T4 (THYROXINE): 11.2 UG/DL (ref 5.53–11)
TROPONIN I: < 0.01 NG/ML (ref 0–0.03)
TSH SERPL-ACNC: 2.68 UIU/ML (ref 0.47–4.68)
WBC # BLD AUTO: 10.6 K/MM3 (ref 4.8–10.8)

## 2021-02-28 PROCEDURE — 85025 COMPLETE CBC W/AUTO DIFF WBC: CPT

## 2021-02-28 PROCEDURE — 93005 ELECTROCARDIOGRAM TRACING: CPT

## 2021-02-28 PROCEDURE — 71045 X-RAY EXAM CHEST 1 VIEW: CPT

## 2021-02-28 PROCEDURE — U0003 INFECTIOUS AGENT DETECTION BY NUCLEIC ACID (DNA OR RNA); SEVERE ACUTE RESPIRATORY SYNDROME CORONAVIRUS 2 (SARS-COV-2) (CORONAVIRUS DISEASE [COVID-19]), AMPLIFIED PROBE TECHNIQUE, MAKING USE OF HIGH THROUGHPUT TECHNOLOGIES AS DESCRIBED BY CMS-2020-01-R: HCPCS

## 2021-02-28 PROCEDURE — 99284 EMERGENCY DEPT VISIT MOD MDM: CPT

## 2021-02-28 PROCEDURE — 80048 BASIC METABOLIC PNL TOTAL CA: CPT

## 2021-02-28 PROCEDURE — 71275 CT ANGIOGRAPHY CHEST: CPT

## 2021-02-28 PROCEDURE — 84484 ASSAY OF TROPONIN QUANT: CPT

## 2021-02-28 PROCEDURE — 84436 ASSAY OF TOTAL THYROXINE: CPT

## 2021-02-28 PROCEDURE — 84443 ASSAY THYROID STIM HORMONE: CPT

## 2021-02-28 NOTE — PC.NURSE
1st nitro pt stated pain was  about the same  on reassessment. Given another sl tab. On reassessment of 2nd nitro pt stated  the 2nd one helped  and rated it as 1 out of 10.

## 2021-03-01 VITALS
OXYGEN SATURATION: 97 % | TEMPERATURE: 97.9 F | RESPIRATION RATE: 17 BRPM | SYSTOLIC BLOOD PRESSURE: 125 MMHG | DIASTOLIC BLOOD PRESSURE: 69 MMHG | HEART RATE: 76 BPM

## 2021-03-01 VITALS
OXYGEN SATURATION: 96 % | DIASTOLIC BLOOD PRESSURE: 79 MMHG | SYSTOLIC BLOOD PRESSURE: 125 MMHG | RESPIRATION RATE: 16 BRPM | HEART RATE: 88 BPM

## 2021-03-01 VITALS
HEART RATE: 84 BPM | DIASTOLIC BLOOD PRESSURE: 74 MMHG | RESPIRATION RATE: 16 BRPM | SYSTOLIC BLOOD PRESSURE: 120 MMHG | OXYGEN SATURATION: 97 %

## 2021-03-01 LAB — TROPONIN I: < 0.01 NG/ML (ref 0–0.03)

## 2021-03-04 ENCOUNTER — HOSPITAL ENCOUNTER (OUTPATIENT)
Age: 36
End: 2021-03-04
Payer: COMMERCIAL

## 2021-03-04 DIAGNOSIS — R40.0: ICD-10-CM

## 2021-03-04 DIAGNOSIS — R06.83: ICD-10-CM

## 2021-03-04 DIAGNOSIS — G47.30: Primary | ICD-10-CM

## 2021-03-04 DIAGNOSIS — I10: ICD-10-CM

## 2021-03-04 PROCEDURE — 95806 SLEEP STUDY UNATT&RESP EFFT: CPT

## 2021-03-15 ENCOUNTER — HOSPITAL ENCOUNTER (OUTPATIENT)
Age: 36
End: 2021-03-15
Payer: COMMERCIAL

## 2021-03-15 DIAGNOSIS — R94.31: ICD-10-CM

## 2021-03-15 DIAGNOSIS — R07.89: Primary | ICD-10-CM

## 2021-03-15 DIAGNOSIS — R06.02: ICD-10-CM

## 2021-03-15 PROCEDURE — 78452 HT MUSCLE IMAGE SPECT MULT: CPT

## 2021-03-15 PROCEDURE — A9502 TC99M TETROFOSMIN: HCPCS

## 2021-03-15 PROCEDURE — 93306 TTE W/DOPPLER COMPLETE: CPT

## 2021-03-15 PROCEDURE — 93017 CV STRESS TEST TRACING ONLY: CPT

## 2021-04-07 ENCOUNTER — HOSPITAL ENCOUNTER (OUTPATIENT)
Age: 36
End: 2021-04-07
Payer: COMMERCIAL

## 2021-04-07 VITALS
TEMPERATURE: 98.42 F | RESPIRATION RATE: 18 BRPM | SYSTOLIC BLOOD PRESSURE: 134 MMHG | DIASTOLIC BLOOD PRESSURE: 68 MMHG | OXYGEN SATURATION: 96 % | HEART RATE: 74 BPM

## 2021-04-07 DIAGNOSIS — R07.89: Primary | ICD-10-CM

## 2021-04-07 PROCEDURE — 75574 CT ANGIO HRT W/3D IMAGE: CPT

## 2021-04-17 ENCOUNTER — HOSPITAL ENCOUNTER (OUTPATIENT)
Age: 36
End: 2021-04-17
Payer: COMMERCIAL

## 2021-04-17 DIAGNOSIS — R94.31: ICD-10-CM

## 2021-04-17 DIAGNOSIS — R94.30: ICD-10-CM

## 2021-04-17 DIAGNOSIS — I10: ICD-10-CM

## 2021-04-17 DIAGNOSIS — R07.9: ICD-10-CM

## 2021-04-17 DIAGNOSIS — I20.9: ICD-10-CM

## 2021-04-17 DIAGNOSIS — Z11.52: ICD-10-CM

## 2021-04-17 DIAGNOSIS — Z72.0: ICD-10-CM

## 2021-04-17 DIAGNOSIS — R06.00: Primary | ICD-10-CM

## 2021-04-17 LAB
ANION GAP SERPL CALC-SCNC: 12.5 MEQ/L (ref 5–15)
BUN SERPL-MCNC: 13 MG/DL (ref 9–20)
CALCIUM SPEC-MCNC: 9.8 MG/DL (ref 8.4–10.2)
CHLORIDE SPEC-SCNC: 103 MMOL/L (ref 98–107)
CO2 SERPL-SCNC: 26 MMOL/L (ref 22–30)
CREAT BLD-SCNC: 0.8 MG/DL (ref 0.66–1.25)
ESTIMATED GLOMERULAR FILT RATE: 109 ML/MIN (ref 60–?)
GFR (AFRICAN AMERICAN): 132 ML/MIN (ref 60–?)
GLUCOSE: 129 MG/DL (ref 74–100)
HCT VFR BLD CALC: 44.2 % (ref 42–52)
HGB BLD-MCNC: 14.9 G/DL (ref 14.1–18)
MCHC RBC-ENTMCNC: 33.7 G/DL (ref 31.8–35.4)
MCV RBC: 87.1 FL (ref 80–94)
MEAN CORPUSCULAR HEMOGLOBIN: 29.4 PG (ref 27–31.2)
PLATELET # BLD: 294 K/MM3 (ref 142–424)
POTASSIUM: 4.5 MMOL/L (ref 3.5–5.1)
RBC # BLD AUTO: 5.07 M/MM3 (ref 4.6–6.2)
SODIUM SPEC-SCNC: 137 MMOL/L (ref 136–145)
WBC # BLD AUTO: 7.9 K/MM3 (ref 4.8–10.8)

## 2021-04-17 PROCEDURE — 85025 COMPLETE CBC W/AUTO DIFF WBC: CPT

## 2021-04-17 PROCEDURE — 36415 COLL VENOUS BLD VENIPUNCTURE: CPT

## 2021-04-17 PROCEDURE — 80048 BASIC METABOLIC PNL TOTAL CA: CPT

## 2021-04-17 PROCEDURE — 86328 IA NFCT AB SARSCOV2 COVID19: CPT

## 2021-04-19 ENCOUNTER — HOSPITAL ENCOUNTER (OUTPATIENT)
Dept: HOSPITAL 22 - OUTP | Age: 36
Discharge: HOME | End: 2021-04-19
Payer: COMMERCIAL

## 2021-04-19 ENCOUNTER — ON CAMPUS - OUTPATIENT (OUTPATIENT)
Dept: RURAL HOSPITAL 6 | Facility: HOSPITAL | Age: 36
End: 2021-04-19

## 2021-04-19 VITALS
HEART RATE: 72 BPM | DIASTOLIC BLOOD PRESSURE: 65 MMHG | SYSTOLIC BLOOD PRESSURE: 100 MMHG | OXYGEN SATURATION: 95 % | RESPIRATION RATE: 18 BRPM

## 2021-04-19 VITALS
RESPIRATION RATE: 18 BRPM | TEMPERATURE: 97.8 F | HEART RATE: 77 BPM | DIASTOLIC BLOOD PRESSURE: 72 MMHG | SYSTOLIC BLOOD PRESSURE: 131 MMHG | OXYGEN SATURATION: 98 %

## 2021-04-19 VITALS
SYSTOLIC BLOOD PRESSURE: 111 MMHG | OXYGEN SATURATION: 95 % | DIASTOLIC BLOOD PRESSURE: 54 MMHG | RESPIRATION RATE: 12 BRPM | HEART RATE: 86 BPM | TEMPERATURE: 97.5 F

## 2021-04-19 VITALS
OXYGEN SATURATION: 99 % | SYSTOLIC BLOOD PRESSURE: 106 MMHG | RESPIRATION RATE: 18 BRPM | HEART RATE: 69 BPM | DIASTOLIC BLOOD PRESSURE: 61 MMHG

## 2021-04-19 VITALS
SYSTOLIC BLOOD PRESSURE: 131 MMHG | RESPIRATION RATE: 18 BRPM | OXYGEN SATURATION: 99 % | HEART RATE: 70 BPM | DIASTOLIC BLOOD PRESSURE: 75 MMHG

## 2021-04-19 VITALS — SYSTOLIC BLOOD PRESSURE: 130 MMHG | TEMPERATURE: 97.52 F | DIASTOLIC BLOOD PRESSURE: 71 MMHG | HEART RATE: 72 BPM

## 2021-04-19 VITALS — BODY MASS INDEX: 40.1 KG/M2

## 2021-04-19 VITALS
SYSTOLIC BLOOD PRESSURE: 111 MMHG | HEART RATE: 84 BPM | RESPIRATION RATE: 18 BRPM | OXYGEN SATURATION: 95 % | TEMPERATURE: 97.52 F | DIASTOLIC BLOOD PRESSURE: 54 MMHG

## 2021-04-19 VITALS
RESPIRATION RATE: 18 BRPM | HEART RATE: 72 BPM | OXYGEN SATURATION: 99 % | SYSTOLIC BLOOD PRESSURE: 130 MMHG | DIASTOLIC BLOOD PRESSURE: 71 MMHG

## 2021-04-19 VITALS — OXYGEN SATURATION: 97 %

## 2021-04-19 DIAGNOSIS — I25.10: ICD-10-CM

## 2021-04-19 DIAGNOSIS — J45.909: ICD-10-CM

## 2021-04-19 DIAGNOSIS — F32.9: ICD-10-CM

## 2021-04-19 DIAGNOSIS — K31.9: ICD-10-CM

## 2021-04-19 DIAGNOSIS — K22.4: ICD-10-CM

## 2021-04-19 DIAGNOSIS — K44.9: ICD-10-CM

## 2021-04-19 DIAGNOSIS — R10.13 EPIGASTRIC PAIN: ICD-10-CM

## 2021-04-19 DIAGNOSIS — F41.9: ICD-10-CM

## 2021-04-19 DIAGNOSIS — I10: ICD-10-CM

## 2021-04-19 DIAGNOSIS — K21.00 GASTRO-ESOPHAGEAL REFLUX DISEASE WITH ESOPHAGITIS, WITHOUT B: ICD-10-CM

## 2021-04-19 DIAGNOSIS — Z86.14: ICD-10-CM

## 2021-04-19 DIAGNOSIS — Z87.39: ICD-10-CM

## 2021-04-19 DIAGNOSIS — E78.5: ICD-10-CM

## 2021-04-19 DIAGNOSIS — K22.4 DYSKINESIA OF ESOPHAGUS: ICD-10-CM

## 2021-04-19 DIAGNOSIS — K21.9: Primary | ICD-10-CM

## 2021-04-19 PROCEDURE — 43239 EGD BIOPSY SINGLE/MULTIPLE: CPT

## 2021-04-19 PROCEDURE — 43239 EGD BIOPSY SINGLE/MULTIPLE: CPT | Mod: 59 | Performed by: INTERNAL MEDICINE

## 2021-04-19 PROCEDURE — 0DJ08ZZ INSPECTION OF UPPER INTESTINAL TRACT, VIA NATURAL OR ARTIFICIAL OPENING ENDOSCOPIC: ICD-10-PCS | Performed by: INTERNAL MEDICINE

## 2021-04-19 PROCEDURE — 43249 ESOPH EGD DILATION <30 MM: CPT | Performed by: INTERNAL MEDICINE

## 2021-05-14 ENCOUNTER — HOSPITAL ENCOUNTER (OUTPATIENT)
Age: 36
End: 2021-05-14
Payer: SELF-PAY

## 2021-05-14 DIAGNOSIS — R94.30: ICD-10-CM

## 2021-05-14 DIAGNOSIS — I20.9: ICD-10-CM

## 2021-05-14 DIAGNOSIS — Z13.6: Primary | ICD-10-CM

## 2021-05-14 DIAGNOSIS — Z82.49: ICD-10-CM

## 2021-05-14 DIAGNOSIS — Z72.0: ICD-10-CM

## 2021-05-14 DIAGNOSIS — R94.31: ICD-10-CM

## 2021-05-14 DIAGNOSIS — R06.02: ICD-10-CM

## 2021-05-14 PROCEDURE — 75571 CT HRT W/O DYE W/CA TEST: CPT

## 2021-06-09 ENCOUNTER — HOSPITAL ENCOUNTER (OUTPATIENT)
Age: 36
End: 2021-06-09
Payer: COMMERCIAL

## 2021-06-09 DIAGNOSIS — Z11.52: ICD-10-CM

## 2021-06-09 DIAGNOSIS — Z01.812: Primary | ICD-10-CM

## 2021-06-09 PROCEDURE — U0003 INFECTIOUS AGENT DETECTION BY NUCLEIC ACID (DNA OR RNA); SEVERE ACUTE RESPIRATORY SYNDROME CORONAVIRUS 2 (SARS-COV-2) (CORONAVIRUS DISEASE [COVID-19]), AMPLIFIED PROBE TECHNIQUE, MAKING USE OF HIGH THROUGHPUT TECHNOLOGIES AS DESCRIBED BY CMS-2020-01-R: HCPCS

## 2021-08-06 ENCOUNTER — HOSPITAL ENCOUNTER (OUTPATIENT)
Age: 36
End: 2021-08-06
Payer: COMMERCIAL

## 2021-08-06 DIAGNOSIS — Z01.812: Primary | ICD-10-CM

## 2021-08-06 DIAGNOSIS — Z11.52: ICD-10-CM

## 2021-08-06 DIAGNOSIS — Z12.11: ICD-10-CM

## 2021-08-06 PROCEDURE — U0003 INFECTIOUS AGENT DETECTION BY NUCLEIC ACID (DNA OR RNA); SEVERE ACUTE RESPIRATORY SYNDROME CORONAVIRUS 2 (SARS-COV-2) (CORONAVIRUS DISEASE [COVID-19]), AMPLIFIED PROBE TECHNIQUE, MAKING USE OF HIGH THROUGHPUT TECHNOLOGIES AS DESCRIBED BY CMS-2020-01-R: HCPCS

## 2021-08-09 ENCOUNTER — ON CAMPUS - OUTPATIENT (OUTPATIENT)
Dept: RURAL HOSPITAL 6 | Facility: HOSPITAL | Age: 36
End: 2021-08-09
Payer: COMMERCIAL

## 2021-08-09 ENCOUNTER — HOSPITAL ENCOUNTER (OUTPATIENT)
Dept: HOSPITAL 22 - OUTP | Age: 36
Discharge: HOME | End: 2021-08-09
Payer: COMMERCIAL

## 2021-08-09 VITALS
SYSTOLIC BLOOD PRESSURE: 134 MMHG | OXYGEN SATURATION: 98 % | DIASTOLIC BLOOD PRESSURE: 65 MMHG | RESPIRATION RATE: 16 BRPM | HEART RATE: 65 BPM

## 2021-08-09 VITALS — BODY MASS INDEX: 40.4 KG/M2

## 2021-08-09 VITALS
DIASTOLIC BLOOD PRESSURE: 78 MMHG | SYSTOLIC BLOOD PRESSURE: 140 MMHG | HEART RATE: 79 BPM | RESPIRATION RATE: 18 BRPM | TEMPERATURE: 98.2 F | OXYGEN SATURATION: 98 %

## 2021-08-09 VITALS
SYSTOLIC BLOOD PRESSURE: 113 MMHG | DIASTOLIC BLOOD PRESSURE: 68 MMHG | OXYGEN SATURATION: 96 % | HEART RATE: 76 BPM | RESPIRATION RATE: 16 BRPM

## 2021-08-09 VITALS
HEART RATE: 78 BPM | TEMPERATURE: 97.1 F | OXYGEN SATURATION: 94 % | DIASTOLIC BLOOD PRESSURE: 75 MMHG | RESPIRATION RATE: 12 BRPM | SYSTOLIC BLOOD PRESSURE: 112 MMHG

## 2021-08-09 VITALS
RESPIRATION RATE: 16 BRPM | SYSTOLIC BLOOD PRESSURE: 123 MMHG | OXYGEN SATURATION: 97 % | DIASTOLIC BLOOD PRESSURE: 73 MMHG | HEART RATE: 64 BPM | TEMPERATURE: 97.1 F

## 2021-08-09 VITALS — OXYGEN SATURATION: 97 %

## 2021-08-09 DIAGNOSIS — I49.9: ICD-10-CM

## 2021-08-09 DIAGNOSIS — R10.13: ICD-10-CM

## 2021-08-09 DIAGNOSIS — K64.0: ICD-10-CM

## 2021-08-09 DIAGNOSIS — Z79.899: ICD-10-CM

## 2021-08-09 DIAGNOSIS — R19.4: Primary | ICD-10-CM

## 2021-08-09 DIAGNOSIS — Z79.82: ICD-10-CM

## 2021-08-09 DIAGNOSIS — J45.909: ICD-10-CM

## 2021-08-09 DIAGNOSIS — F41.9: ICD-10-CM

## 2021-08-09 DIAGNOSIS — I25.10: ICD-10-CM

## 2021-08-09 DIAGNOSIS — R19.4 CHANGE IN BOWEL HABIT: ICD-10-CM

## 2021-08-09 DIAGNOSIS — I10: ICD-10-CM

## 2021-08-09 DIAGNOSIS — Z86.14: ICD-10-CM

## 2021-08-09 DIAGNOSIS — E78.5: ICD-10-CM

## 2021-08-09 DIAGNOSIS — K64.8 OTHER HEMORRHOIDS: ICD-10-CM

## 2021-08-09 PROCEDURE — 45378 DIAGNOSTIC COLONOSCOPY: CPT | Performed by: INTERNAL MEDICINE

## 2021-08-09 PROCEDURE — 0DJD8ZZ INSPECTION OF LOWER INTESTINAL TRACT, VIA NATURAL OR ARTIFICIAL OPENING ENDOSCOPIC: ICD-10-PCS | Performed by: INTERNAL MEDICINE

## 2021-08-09 PROCEDURE — 45378 DIAGNOSTIC COLONOSCOPY: CPT

## 2021-10-11 ENCOUNTER — HOSPITAL ENCOUNTER (OUTPATIENT)
Age: 36
End: 2021-10-11
Payer: COMMERCIAL

## 2021-10-11 DIAGNOSIS — M79.671: ICD-10-CM

## 2021-10-11 DIAGNOSIS — M79.672: Primary | ICD-10-CM

## 2021-10-11 PROCEDURE — 73630 X-RAY EXAM OF FOOT: CPT

## 2021-11-08 ENCOUNTER — HOSPITAL ENCOUNTER (EMERGENCY)
Age: 36
Discharge: HOME | End: 2021-11-08
Payer: COMMERCIAL

## 2021-11-08 VITALS
OXYGEN SATURATION: 98 % | RESPIRATION RATE: 19 BRPM | TEMPERATURE: 97.88 F | SYSTOLIC BLOOD PRESSURE: 146 MMHG | DIASTOLIC BLOOD PRESSURE: 91 MMHG | HEART RATE: 68 BPM

## 2021-11-08 VITALS
HEART RATE: 68 BPM | RESPIRATION RATE: 19 BRPM | SYSTOLIC BLOOD PRESSURE: 146 MMHG | TEMPERATURE: 97.9 F | DIASTOLIC BLOOD PRESSURE: 91 MMHG | OXYGEN SATURATION: 98 %

## 2021-11-08 VITALS — BODY MASS INDEX: 41.5 KG/M2
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
## 2021-11-08 DIAGNOSIS — Z87.891: ICD-10-CM

## 2021-11-08 DIAGNOSIS — E78.5: ICD-10-CM
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
## 2021-11-08 DIAGNOSIS — I10: ICD-10-CM

## 2021-11-08 DIAGNOSIS — F41.8: ICD-10-CM

## 2021-11-08 DIAGNOSIS — S83.92XA: Primary | ICD-10-CM

## 2021-11-08 PROCEDURE — G0463 HOSPITAL OUTPT CLINIC VISIT: HCPCS

## 2021-11-08 PROCEDURE — 73562 X-RAY EXAM OF KNEE 3: CPT

## 2021-11-08 PROCEDURE — 29505 APPLICATION LONG LEG SPLINT: CPT

## 2021-11-08 PROCEDURE — 99202 OFFICE O/P NEW SF 15 MIN: CPT

## 2021-11-19 ENCOUNTER — HOSPITAL ENCOUNTER (OUTPATIENT)
Age: 36
End: 2021-11-19
Payer: COMMERCIAL

## 2021-11-19 DIAGNOSIS — M25.362: ICD-10-CM

## 2021-11-19 DIAGNOSIS — Z98.890: ICD-10-CM

## 2021-11-19 DIAGNOSIS — H05.53: Primary | ICD-10-CM

## 2021-11-19 DIAGNOSIS — S89.92XD: ICD-10-CM

## 2021-11-19 PROCEDURE — 70200 X-RAY EXAM OF EYE SOCKETS: CPT

## 2021-11-19 PROCEDURE — 73721 MRI JNT OF LWR EXTRE W/O DYE: CPT

## 2021-12-14 ENCOUNTER — HOSPITAL ENCOUNTER (EMERGENCY)
Age: 36
Discharge: HOME | End: 2021-12-14
Payer: COMMERCIAL

## 2021-12-14 VITALS
DIASTOLIC BLOOD PRESSURE: 83 MMHG | TEMPERATURE: 98.2 F | SYSTOLIC BLOOD PRESSURE: 135 MMHG | RESPIRATION RATE: 18 BRPM | HEART RATE: 76 BPM

## 2021-12-14 VITALS
TEMPERATURE: 98.24 F | OXYGEN SATURATION: 99 % | SYSTOLIC BLOOD PRESSURE: 135 MMHG | RESPIRATION RATE: 18 BRPM | HEART RATE: 76 BPM | DIASTOLIC BLOOD PRESSURE: 83 MMHG

## 2021-12-14 VITALS — BODY MASS INDEX: 41.5 KG/M2

## 2021-12-14 DIAGNOSIS — E78.5: ICD-10-CM

## 2021-12-14 DIAGNOSIS — J02.0: Primary | ICD-10-CM

## 2021-12-14 DIAGNOSIS — J20.9: ICD-10-CM

## 2021-12-14 DIAGNOSIS — I10: ICD-10-CM

## 2021-12-14 DIAGNOSIS — F41.8: ICD-10-CM

## 2021-12-14 DIAGNOSIS — Z87.891: ICD-10-CM

## 2021-12-14 PROCEDURE — U0003 INFECTIOUS AGENT DETECTION BY NUCLEIC ACID (DNA OR RNA); SEVERE ACUTE RESPIRATORY SYNDROME CORONAVIRUS 2 (SARS-COV-2) (CORONAVIRUS DISEASE [COVID-19]), AMPLIFIED PROBE TECHNIQUE, MAKING USE OF HIGH THROUGHPUT TECHNOLOGIES AS DESCRIBED BY CMS-2020-01-R: HCPCS

## 2021-12-14 PROCEDURE — 87880 STREP A ASSAY W/OPTIC: CPT

## 2021-12-14 PROCEDURE — U0005 INFEC AGEN DETEC AMPLI PROBE: HCPCS

## 2021-12-14 PROCEDURE — 99203 OFFICE O/P NEW LOW 30 MIN: CPT

## 2021-12-14 PROCEDURE — C9803 HOPD COVID-19 SPEC COLLECT: HCPCS

## 2021-12-14 PROCEDURE — G0463 HOSPITAL OUTPT CLINIC VISIT: HCPCS

## 2021-12-30 ENCOUNTER — HOSPITAL ENCOUNTER (OUTPATIENT)
Age: 36
End: 2021-12-30
Payer: COMMERCIAL

## 2021-12-30 DIAGNOSIS — Z20.822: Primary | ICD-10-CM

## 2021-12-30 PROCEDURE — U0005 INFEC AGEN DETEC AMPLI PROBE: HCPCS

## 2021-12-30 PROCEDURE — U0003 INFECTIOUS AGENT DETECTION BY NUCLEIC ACID (DNA OR RNA); SEVERE ACUTE RESPIRATORY SYNDROME CORONAVIRUS 2 (SARS-COV-2) (CORONAVIRUS DISEASE [COVID-19]), AMPLIFIED PROBE TECHNIQUE, MAKING USE OF HIGH THROUGHPUT TECHNOLOGIES AS DESCRIBED BY CMS-2020-01-R: HCPCS

## 2021-12-30 PROCEDURE — C9803 HOPD COVID-19 SPEC COLLECT: HCPCS

## 2022-01-14 ENCOUNTER — HOSPITAL ENCOUNTER (OUTPATIENT)
Age: 37
End: 2022-01-14
Payer: COMMERCIAL

## 2022-01-14 DIAGNOSIS — U07.1: Primary | ICD-10-CM

## 2022-01-14 PROCEDURE — C9803 HOPD COVID-19 SPEC COLLECT: HCPCS

## 2022-01-14 PROCEDURE — U0003 INFECTIOUS AGENT DETECTION BY NUCLEIC ACID (DNA OR RNA); SEVERE ACUTE RESPIRATORY SYNDROME CORONAVIRUS 2 (SARS-COV-2) (CORONAVIRUS DISEASE [COVID-19]), AMPLIFIED PROBE TECHNIQUE, MAKING USE OF HIGH THROUGHPUT TECHNOLOGIES AS DESCRIBED BY CMS-2020-01-R: HCPCS

## 2022-01-14 PROCEDURE — U0005 INFEC AGEN DETEC AMPLI PROBE: HCPCS

## 2022-02-11 ENCOUNTER — HOSPITAL ENCOUNTER (OUTPATIENT)
Age: 37
End: 2022-02-11
Payer: COMMERCIAL

## 2022-02-11 DIAGNOSIS — U07.1: Primary | ICD-10-CM

## 2022-02-11 PROCEDURE — C9803 HOPD COVID-19 SPEC COLLECT: HCPCS

## 2022-02-11 PROCEDURE — U0005 INFEC AGEN DETEC AMPLI PROBE: HCPCS

## 2022-02-11 PROCEDURE — U0003 INFECTIOUS AGENT DETECTION BY NUCLEIC ACID (DNA OR RNA); SEVERE ACUTE RESPIRATORY SYNDROME CORONAVIRUS 2 (SARS-COV-2) (CORONAVIRUS DISEASE [COVID-19]), AMPLIFIED PROBE TECHNIQUE, MAKING USE OF HIGH THROUGHPUT TECHNOLOGIES AS DESCRIBED BY CMS-2020-01-R: HCPCS

## 2022-06-06 ENCOUNTER — HOSPITAL ENCOUNTER (EMERGENCY)
Age: 37
Discharge: HOME | End: 2022-06-06
Payer: COMMERCIAL

## 2022-06-06 VITALS — BODY MASS INDEX: 39.9 KG/M2

## 2022-06-06 VITALS
TEMPERATURE: 98.24 F | OXYGEN SATURATION: 97 % | SYSTOLIC BLOOD PRESSURE: 143 MMHG | DIASTOLIC BLOOD PRESSURE: 83 MMHG | RESPIRATION RATE: 19 BRPM | HEART RATE: 109 BPM

## 2022-06-06 VITALS
OXYGEN SATURATION: 97 % | DIASTOLIC BLOOD PRESSURE: 83 MMHG | HEART RATE: 109 BPM | TEMPERATURE: 98.24 F | SYSTOLIC BLOOD PRESSURE: 143 MMHG | RESPIRATION RATE: 19 BRPM

## 2022-06-06 DIAGNOSIS — G43.909: Primary | ICD-10-CM

## 2022-06-06 PROCEDURE — G0463 HOSPITAL OUTPT CLINIC VISIT: HCPCS

## 2022-06-06 PROCEDURE — 99212 OFFICE O/P EST SF 10 MIN: CPT

## 2022-06-06 PROCEDURE — 96372 THER/PROPH/DIAG INJ SC/IM: CPT

## 2022-06-20 ENCOUNTER — HOSPITAL ENCOUNTER (EMERGENCY)
Age: 37
Discharge: HOME | End: 2022-06-20
Payer: COMMERCIAL

## 2022-06-20 VITALS
HEART RATE: 86 BPM | SYSTOLIC BLOOD PRESSURE: 141 MMHG | DIASTOLIC BLOOD PRESSURE: 91 MMHG | OXYGEN SATURATION: 98 % | RESPIRATION RATE: 19 BRPM | TEMPERATURE: 98.3 F

## 2022-06-20 VITALS
RESPIRATION RATE: 19 BRPM | DIASTOLIC BLOOD PRESSURE: 91 MMHG | HEART RATE: 86 BPM | TEMPERATURE: 98.3 F | OXYGEN SATURATION: 98 % | SYSTOLIC BLOOD PRESSURE: 141 MMHG

## 2022-06-20 VITALS — BODY MASS INDEX: 36.3 KG/M2

## 2022-06-20 DIAGNOSIS — J45.909: ICD-10-CM

## 2022-06-20 DIAGNOSIS — E78.5: ICD-10-CM

## 2022-06-20 DIAGNOSIS — Z87.891: ICD-10-CM

## 2022-06-20 DIAGNOSIS — I25.10: ICD-10-CM

## 2022-06-20 DIAGNOSIS — Z79.52: ICD-10-CM

## 2022-06-20 DIAGNOSIS — F32.A: ICD-10-CM

## 2022-06-20 DIAGNOSIS — Z86.14: ICD-10-CM

## 2022-06-20 DIAGNOSIS — F41.9: ICD-10-CM

## 2022-06-20 DIAGNOSIS — I10: ICD-10-CM

## 2022-06-20 DIAGNOSIS — I49.9: ICD-10-CM

## 2022-06-20 DIAGNOSIS — Z79.82: ICD-10-CM

## 2022-06-20 DIAGNOSIS — G43.909: Primary | ICD-10-CM

## 2022-06-20 DIAGNOSIS — Z79.899: ICD-10-CM

## 2022-06-20 PROCEDURE — 96372 THER/PROPH/DIAG INJ SC/IM: CPT

## 2022-06-20 PROCEDURE — 99213 OFFICE O/P EST LOW 20 MIN: CPT

## 2022-06-20 PROCEDURE — G0463 HOSPITAL OUTPT CLINIC VISIT: HCPCS

## 2022-06-22 ENCOUNTER — HOSPITAL ENCOUNTER (OUTPATIENT)
Age: 37
End: 2022-06-22
Payer: COMMERCIAL

## 2022-06-22 DIAGNOSIS — M25.531: ICD-10-CM

## 2022-06-22 DIAGNOSIS — M25.532: Primary | ICD-10-CM

## 2022-06-22 PROCEDURE — 73110 X-RAY EXAM OF WRIST: CPT

## 2022-09-04 ENCOUNTER — HOSPITAL ENCOUNTER (EMERGENCY)
Age: 37
Discharge: HOME | End: 2022-09-04
Payer: COMMERCIAL

## 2022-09-04 VITALS
DIASTOLIC BLOOD PRESSURE: 97 MMHG | HEART RATE: 129 BPM | RESPIRATION RATE: 24 BRPM | SYSTOLIC BLOOD PRESSURE: 132 MMHG | TEMPERATURE: 97.9 F | OXYGEN SATURATION: 99 %

## 2022-09-04 VITALS
RESPIRATION RATE: 24 BRPM | HEART RATE: 129 BPM | DIASTOLIC BLOOD PRESSURE: 97 MMHG | OXYGEN SATURATION: 99 % | TEMPERATURE: 97.88 F | SYSTOLIC BLOOD PRESSURE: 132 MMHG

## 2022-09-04 VITALS — BODY MASS INDEX: 40 KG/M2

## 2022-09-04 DIAGNOSIS — R68.83: ICD-10-CM

## 2022-09-04 DIAGNOSIS — R53.1: ICD-10-CM

## 2022-09-04 DIAGNOSIS — J06.9: Primary | ICD-10-CM

## 2022-09-04 DIAGNOSIS — R09.81: ICD-10-CM

## 2022-09-04 DIAGNOSIS — R05.9: ICD-10-CM

## 2022-09-04 PROCEDURE — 71101 X-RAY EXAM UNILAT RIBS/CHEST: CPT

## 2022-09-04 PROCEDURE — U0005 INFEC AGEN DETEC AMPLI PROBE: HCPCS

## 2022-09-04 PROCEDURE — U0003 INFECTIOUS AGENT DETECTION BY NUCLEIC ACID (DNA OR RNA); SEVERE ACUTE RESPIRATORY SYNDROME CORONAVIRUS 2 (SARS-COV-2) (CORONAVIRUS DISEASE [COVID-19]), AMPLIFIED PROBE TECHNIQUE, MAKING USE OF HIGH THROUGHPUT TECHNOLOGIES AS DESCRIBED BY CMS-2020-01-R: HCPCS

## 2022-09-04 PROCEDURE — 99212 OFFICE O/P EST SF 10 MIN: CPT

## 2022-09-04 PROCEDURE — 96372 THER/PROPH/DIAG INJ SC/IM: CPT

## 2022-09-04 PROCEDURE — C9803 HOPD COVID-19 SPEC COLLECT: HCPCS

## 2022-09-04 PROCEDURE — G0463 HOSPITAL OUTPT CLINIC VISIT: HCPCS

## 2022-10-03 ENCOUNTER — HOSPITAL ENCOUNTER (EMERGENCY)
Age: 37
Discharge: HOME | End: 2022-10-03
Payer: COMMERCIAL

## 2022-10-03 VITALS
RESPIRATION RATE: 18 BRPM | SYSTOLIC BLOOD PRESSURE: 118 MMHG | DIASTOLIC BLOOD PRESSURE: 67 MMHG | OXYGEN SATURATION: 100 % | HEART RATE: 80 BPM | TEMPERATURE: 97.8 F

## 2022-10-03 VITALS
HEART RATE: 97 BPM | RESPIRATION RATE: 20 BRPM | OXYGEN SATURATION: 96 % | SYSTOLIC BLOOD PRESSURE: 128 MMHG | TEMPERATURE: 97.6 F | DIASTOLIC BLOOD PRESSURE: 103 MMHG

## 2022-10-03 VITALS — OXYGEN SATURATION: 96 % | HEART RATE: 98 BPM

## 2022-10-03 VITALS — BODY MASS INDEX: 38.7 KG/M2

## 2022-10-03 VITALS — HEART RATE: 94 BPM | OXYGEN SATURATION: 97 %

## 2022-10-03 DIAGNOSIS — J45.909: ICD-10-CM

## 2022-10-03 DIAGNOSIS — Z79.52: ICD-10-CM

## 2022-10-03 DIAGNOSIS — R05.9: ICD-10-CM

## 2022-10-03 DIAGNOSIS — Z79.899: ICD-10-CM

## 2022-10-03 DIAGNOSIS — F41.9: ICD-10-CM

## 2022-10-03 DIAGNOSIS — Z79.51: ICD-10-CM

## 2022-10-03 DIAGNOSIS — R51.9: Primary | ICD-10-CM

## 2022-10-03 DIAGNOSIS — I25.10: ICD-10-CM

## 2022-10-03 DIAGNOSIS — R11.0: ICD-10-CM

## 2022-10-03 DIAGNOSIS — H53.8: ICD-10-CM

## 2022-10-03 DIAGNOSIS — M54.2: ICD-10-CM

## 2022-10-03 DIAGNOSIS — E78.5: ICD-10-CM

## 2022-10-03 DIAGNOSIS — I10: ICD-10-CM

## 2022-10-03 DIAGNOSIS — Z79.82: ICD-10-CM

## 2022-10-03 DIAGNOSIS — Z87.442: ICD-10-CM

## 2022-10-03 DIAGNOSIS — F32.A: ICD-10-CM

## 2022-10-03 DIAGNOSIS — I49.9: ICD-10-CM

## 2022-10-03 DIAGNOSIS — R06.02: ICD-10-CM

## 2022-10-03 DIAGNOSIS — Z86.14: ICD-10-CM

## 2022-10-03 DIAGNOSIS — F17.290: ICD-10-CM

## 2022-10-03 LAB
ANION GAP SERPL CALC-SCNC: 13.3 MEQ/L (ref 5–15)
APTT PPP: 27.3 SECONDS (ref 22.8–30.6)
BUN SERPL-MCNC: 11 MG/DL (ref 9–20)
CALCIUM SPEC-MCNC: 8.9 MG/DL (ref 8.4–10.2)
CHLORIDE SPEC-SCNC: 100 MMOL/L (ref 98–107)
CO2 SERPL-SCNC: 29 MMOL/L (ref 22–30)
CREAT BLD-SCNC: 0.8 MG/DL (ref 0.66–1.25)
CREATININE CLEARANCE ESTIMATED: 219 ML/MIN (ref 50–200)
ESTIMATED GLOMERULAR FILT RATE: 109 ML/MIN (ref 60–?)
GFR (AFRICAN AMERICAN): 132 ML/MIN (ref 60–?)
GLUCOSE: 179 MG/DL (ref 74–100)
HCT VFR BLD CALC: 47.4 % (ref 42–52)
HGB BLD-MCNC: 16 G/DL (ref 14.1–18)
INR PPP: 0.95 (ref 0.9–1.1)
MCHC RBC-ENTMCNC: 33.7 G/DL (ref 31.8–35.4)
MCV RBC: 89.9 FL (ref 80–94)
MEAN CORPUSCULAR HEMOGLOBIN: 30.3 PG (ref 27–31.2)
PLATELET # BLD: 282 K/MM3 (ref 142–424)
POTASSIUM: 4.3 MMOL/L (ref 3.5–5.1)
PT BLD: 10.3 SECONDS (ref 10.1–12.5)
RBC # BLD AUTO: 5.28 M/MM3 (ref 4.6–6.2)
SODIUM SPEC-SCNC: 138 MMOL/L (ref 136–145)
WBC # BLD AUTO: 5.7 K/MM3 (ref 4.8–10.8)

## 2022-10-03 PROCEDURE — 96375 TX/PRO/DX INJ NEW DRUG ADDON: CPT

## 2022-10-03 PROCEDURE — 70450 CT HEAD/BRAIN W/O DYE: CPT

## 2022-10-03 PROCEDURE — 96361 HYDRATE IV INFUSION ADD-ON: CPT

## 2022-10-03 PROCEDURE — 85610 PROTHROMBIN TIME: CPT

## 2022-10-03 PROCEDURE — 96374 THER/PROPH/DIAG INJ IV PUSH: CPT

## 2022-10-03 PROCEDURE — 85730 THROMBOPLASTIN TIME PARTIAL: CPT

## 2022-10-03 PROCEDURE — 99285 EMERGENCY DEPT VISIT HI MDM: CPT

## 2022-10-03 PROCEDURE — 85025 COMPLETE CBC W/AUTO DIFF WBC: CPT

## 2022-10-03 PROCEDURE — 80048 BASIC METABOLIC PNL TOTAL CA: CPT

## 2022-10-24 ENCOUNTER — HOSPITAL ENCOUNTER (EMERGENCY)
Age: 37
Discharge: HOME | End: 2022-10-24
Payer: COMMERCIAL

## 2022-10-24 VITALS
RESPIRATION RATE: 16 BRPM | BODY MASS INDEX: 37.1 KG/M2 | HEART RATE: 86 BPM | OXYGEN SATURATION: 98 % | TEMPERATURE: 98 F | SYSTOLIC BLOOD PRESSURE: 141 MMHG | DIASTOLIC BLOOD PRESSURE: 90 MMHG

## 2022-10-24 VITALS
HEART RATE: 86 BPM | OXYGEN SATURATION: 98 % | SYSTOLIC BLOOD PRESSURE: 141 MMHG | DIASTOLIC BLOOD PRESSURE: 90 MMHG | TEMPERATURE: 98 F | RESPIRATION RATE: 16 BRPM

## 2022-10-24 DIAGNOSIS — R19.7: Primary | ICD-10-CM

## 2022-10-24 DIAGNOSIS — R05.9: ICD-10-CM

## 2022-10-24 DIAGNOSIS — F41.9: ICD-10-CM

## 2022-10-24 DIAGNOSIS — R11.0: ICD-10-CM

## 2022-10-24 DIAGNOSIS — Z79.52: ICD-10-CM

## 2022-10-24 DIAGNOSIS — J45.909: ICD-10-CM

## 2022-10-24 DIAGNOSIS — F17.210: ICD-10-CM

## 2022-10-24 DIAGNOSIS — Z79.899: ICD-10-CM

## 2022-10-24 DIAGNOSIS — Z79.51: ICD-10-CM

## 2022-10-24 DIAGNOSIS — Z79.82: ICD-10-CM

## 2022-10-24 DIAGNOSIS — I10: ICD-10-CM

## 2022-10-24 DIAGNOSIS — R06.02: ICD-10-CM

## 2022-10-24 DIAGNOSIS — F32.A: ICD-10-CM

## 2022-10-24 PROCEDURE — 99212 OFFICE O/P EST SF 10 MIN: CPT

## 2022-10-24 PROCEDURE — G0463 HOSPITAL OUTPT CLINIC VISIT: HCPCS

## 2022-11-21 ENCOUNTER — HOSPITAL ENCOUNTER (EMERGENCY)
Age: 37
Discharge: HOME | End: 2022-11-21
Payer: COMMERCIAL

## 2022-11-21 VITALS
TEMPERATURE: 97.8 F | HEART RATE: 96 BPM | RESPIRATION RATE: 18 BRPM | OXYGEN SATURATION: 99 % | SYSTOLIC BLOOD PRESSURE: 128 MMHG | DIASTOLIC BLOOD PRESSURE: 76 MMHG

## 2022-11-21 VITALS
SYSTOLIC BLOOD PRESSURE: 128 MMHG | TEMPERATURE: 97.88 F | RESPIRATION RATE: 18 BRPM | HEART RATE: 96 BPM | OXYGEN SATURATION: 99 % | DIASTOLIC BLOOD PRESSURE: 76 MMHG

## 2022-11-21 VITALS — BODY MASS INDEX: 40 KG/M2

## 2022-11-21 DIAGNOSIS — J11.1: Primary | ICD-10-CM

## 2022-11-21 PROCEDURE — 87804 INFLUENZA ASSAY W/OPTIC: CPT

## 2022-11-21 PROCEDURE — G0463 HOSPITAL OUTPT CLINIC VISIT: HCPCS

## 2022-11-21 PROCEDURE — 99212 OFFICE O/P EST SF 10 MIN: CPT

## 2022-12-11 ENCOUNTER — HOSPITAL ENCOUNTER (EMERGENCY)
Age: 37
Discharge: HOME | End: 2022-12-11
Payer: COMMERCIAL

## 2022-12-11 VITALS
TEMPERATURE: 98.6 F | RESPIRATION RATE: 19 BRPM | SYSTOLIC BLOOD PRESSURE: 130 MMHG | OXYGEN SATURATION: 97 % | DIASTOLIC BLOOD PRESSURE: 90 MMHG | HEART RATE: 119 BPM

## 2022-12-11 VITALS
OXYGEN SATURATION: 97 % | RESPIRATION RATE: 19 BRPM | DIASTOLIC BLOOD PRESSURE: 90 MMHG | HEART RATE: 119 BPM | TEMPERATURE: 98.6 F | SYSTOLIC BLOOD PRESSURE: 130 MMHG

## 2022-12-11 VITALS — BODY MASS INDEX: 39.7 KG/M2

## 2022-12-11 DIAGNOSIS — U07.1: Primary | ICD-10-CM

## 2022-12-11 PROCEDURE — U0003 INFECTIOUS AGENT DETECTION BY NUCLEIC ACID (DNA OR RNA); SEVERE ACUTE RESPIRATORY SYNDROME CORONAVIRUS 2 (SARS-COV-2) (CORONAVIRUS DISEASE [COVID-19]), AMPLIFIED PROBE TECHNIQUE, MAKING USE OF HIGH THROUGHPUT TECHNOLOGIES AS DESCRIBED BY CMS-2020-01-R: HCPCS

## 2022-12-11 PROCEDURE — U0005 INFEC AGEN DETEC AMPLI PROBE: HCPCS

## 2022-12-11 PROCEDURE — G0463 HOSPITAL OUTPT CLINIC VISIT: HCPCS

## 2022-12-11 PROCEDURE — 99212 OFFICE O/P EST SF 10 MIN: CPT

## 2022-12-11 PROCEDURE — C9803 HOPD COVID-19 SPEC COLLECT: HCPCS

## 2023-05-20 ENCOUNTER — HOSPITAL ENCOUNTER (EMERGENCY)
Age: 38
LOS: 1 days | Discharge: HOME | End: 2023-05-21
Payer: COMMERCIAL

## 2023-05-20 VITALS — BODY MASS INDEX: 39.3 KG/M2

## 2023-05-20 VITALS
OXYGEN SATURATION: 96 % | SYSTOLIC BLOOD PRESSURE: 169 MMHG | TEMPERATURE: 98.8 F | HEART RATE: 112 BPM | RESPIRATION RATE: 16 BRPM | DIASTOLIC BLOOD PRESSURE: 105 MMHG

## 2023-05-20 DIAGNOSIS — R10.33: Primary | ICD-10-CM

## 2023-05-20 DIAGNOSIS — R10.13: ICD-10-CM

## 2023-05-20 LAB
ALBUMIN LEVEL: 4.6 G/DL (ref 3.5–5)
ALBUMIN/GLOB SERPL: 1.6 {RATIO} (ref 1.1–1.8)
ALP ISO SERPL-ACNC: 143 U/L (ref 38–126)
ALT SERPLBLD-CCNC: 141 U/L (ref 12–78)
AMYLASE SERPL-CCNC: 80 U/L (ref 30–110)
ANION GAP SERPL CALC-SCNC: 17.8 MEQ/L (ref 5–15)
AST SERPL QL: 88 U/L (ref 17–59)
BILIRUBIN,TOTAL: 0.5 MG/DL (ref 0.2–1.3)
BUN SERPL-MCNC: 10 MG/DL (ref 9–20)
CALCIUM SPEC-MCNC: 9 MG/DL (ref 8.4–10.2)
CHLORIDE SPEC-SCNC: 98 MMOL/L (ref 98–107)
CO2 SERPL-SCNC: 26 MMOL/L (ref 22–30)
COLOR UR: YELLOW
CREAT BLD-SCNC: 0.8 MG/DL (ref 0.66–1.25)
CREATININE CLEARANCE ESTIMATED: 220 ML/MIN (ref 50–200)
CRP SERPL HS-MCNC: 2.8 MG/L (ref 0–4)
ESTIMATED GLOMERULAR FILT RATE: 108 ML/MIN (ref 60–?)
GFR (AFRICAN AMERICAN): 131 ML/MIN (ref 60–?)
GLOBULIN SER CALC-MCNC: 2.9 G/DL (ref 1.3–3.2)
GLUCOSE: 180 MG/DL (ref 74–100)
HCT VFR BLD CALC: 48.4 % (ref 42–52)
HGB BLD-MCNC: 16.3 G/DL (ref 14.1–18)
LIPASE: 245 U/L (ref 23–300)
MCHC RBC-ENTMCNC: 33.8 G/DL (ref 31.8–35.4)
MCV RBC: 89 FL (ref 80–94)
MEAN CORPUSCULAR HEMOGLOBIN: 30.1 PG (ref 27–31.2)
MICRO URNS: (no result)
PH UR: 6 [PH] (ref 5–8.5)
PLATELET # BLD: 338 K/MM3 (ref 142–424)
POTASSIUM: 3.8 MMOL/L (ref 3.5–5.1)
PROCALCITONIN: 0.1 NG/ML (ref 0–2)
PROT SERPL-MCNC: 7.5 G/DL (ref 6.3–8.2)
PROT UR STRIP-MCNC: (no result) MG/DL
RBC # BLD AUTO: 5.43 M/MM3 (ref 4.6–6.2)
SODIUM SPEC-SCNC: 138 MMOL/L (ref 136–145)
SP GR UR: >= 1.03 (ref 1–1.03)
UROBILINOGEN UR QL: 1 EU/DL
WBC # BLD AUTO: 10 K/MM3 (ref 4.8–10.8)

## 2023-05-20 PROCEDURE — 96374 THER/PROPH/DIAG INJ IV PUSH: CPT

## 2023-05-20 PROCEDURE — 86140 C-REACTIVE PROTEIN: CPT

## 2023-05-20 PROCEDURE — 84145 PROCALCITONIN (PCT): CPT

## 2023-05-20 PROCEDURE — 99285 EMERGENCY DEPT VISIT HI MDM: CPT

## 2023-05-20 PROCEDURE — 85651 RBC SED RATE NONAUTOMATED: CPT

## 2023-05-20 PROCEDURE — 83690 ASSAY OF LIPASE: CPT

## 2023-05-20 PROCEDURE — 85025 COMPLETE CBC W/AUTO DIFF WBC: CPT

## 2023-05-20 PROCEDURE — 81001 URINALYSIS AUTO W/SCOPE: CPT

## 2023-05-20 PROCEDURE — 96375 TX/PRO/DX INJ NEW DRUG ADDON: CPT

## 2023-05-20 PROCEDURE — 82150 ASSAY OF AMYLASE: CPT

## 2023-05-20 PROCEDURE — 74177 CT ABD & PELVIS W/CONTRAST: CPT

## 2023-05-20 PROCEDURE — 80053 COMPREHEN METABOLIC PANEL: CPT

## 2023-05-20 PROCEDURE — 96361 HYDRATE IV INFUSION ADD-ON: CPT

## 2023-05-20 PROCEDURE — 99284 EMERGENCY DEPT VISIT MOD MDM: CPT

## 2023-05-21 VITALS
HEART RATE: 101 BPM | SYSTOLIC BLOOD PRESSURE: 136 MMHG | DIASTOLIC BLOOD PRESSURE: 89 MMHG | TEMPERATURE: 98.42 F | RESPIRATION RATE: 16 BRPM

## 2024-09-17 ENCOUNTER — HOSPITAL ENCOUNTER (EMERGENCY)
Age: 39
Discharge: HOME | End: 2024-09-17
Payer: COMMERCIAL

## 2024-09-17 VITALS — BODY MASS INDEX: 38.7 KG/M2

## 2024-09-17 VITALS
HEART RATE: 99 BPM | SYSTOLIC BLOOD PRESSURE: 121 MMHG | TEMPERATURE: 97.88 F | OXYGEN SATURATION: 98 % | DIASTOLIC BLOOD PRESSURE: 78 MMHG | RESPIRATION RATE: 16 BRPM

## 2024-09-17 VITALS
DIASTOLIC BLOOD PRESSURE: 78 MMHG | OXYGEN SATURATION: 98 % | RESPIRATION RATE: 16 BRPM | HEART RATE: 99 BPM | TEMPERATURE: 97.88 F | SYSTOLIC BLOOD PRESSURE: 121 MMHG

## 2024-09-17 DIAGNOSIS — S91.332A: Primary | ICD-10-CM

## 2024-09-17 DIAGNOSIS — E11.9: ICD-10-CM
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
## 2024-09-17 DIAGNOSIS — Z23: ICD-10-CM

## 2024-09-17 PROCEDURE — 99213 OFFICE O/P EST LOW 20 MIN: CPT

## 2024-09-17 PROCEDURE — 10120 INC&RMVL FB SUBQ TISS SMPL: CPT

## 2024-09-17 PROCEDURE — 90715 TDAP VACCINE 7 YRS/> IM: CPT

## 2024-09-17 PROCEDURE — 73630 X-RAY EXAM OF FOOT: CPT

## 2024-09-17 PROCEDURE — 90471 IMMUNIZATION ADMIN: CPT

## 2024-09-17 PROCEDURE — G0463 HOSPITAL OUTPT CLINIC VISIT: HCPCS

## 2024-09-17 PROCEDURE — 96372 THER/PROPH/DIAG INJ SC/IM: CPT

## 2024-09-17 PROCEDURE — 99214 OFFICE O/P EST MOD 30 MIN: CPT

## 2024-09-17 NOTE — ED_ITS
Discharge Plan    
Disposition    
Patient Disposition: Home, Self-Care    
    
Condition: Good    
    
Prescriptions    
Prescriptions:    
New    
  cephalexin 500 mg capsule     
   500 mg PO QID Qty: 40 0RF    
  mupirocin 2 % ointment     
   1 applic topical TID 7 Days Qty: 15 0RF    
    
No Action    
  meloxicam 7.5 mg tablet     
   7.5 mg PO DAILY     
  paroxetine HCl 12.5 mg tablet extended release 24 hr     
   12.5 mg PO DAILY     
   Patient Comments:    
   TAKE 1 TABLET BY MOUTH ONCE DAILY IN THE MORNING FOR 30 DAYS    
  aspirin 81 mg tablet,delayed release (DR/EC)     
   81 mg PO DAILY     
  atorvastatin 40 mg tablet     
   40 mg PO DAILY Qty: 90 3RF    
  amlodipine 5 mg tablet     
   See Rx Instructions  .ROUTE .COMPLEX Qty: 90 1RF    
   Dose Instruction:    
   Take 1 tablet by mouth once daily    
   Rx Instructions:    
   Take 1 tablet by mouth once daily    
  bisoprolol fumarate 10 MG tablet     
   10 mg PO BID     
  promethazine- ML syrup     
   5 ml PO Q6HP PRN (Reason: Cough) Qty: 240 0RF    
  methylprednisolone 4 MG tablets,dose pack     
   4 mg PO AS DIRECTED 6 Days Qty: 21 0RF    
  amoxicillin-pot clavulanate 1 EACH tablet     
   1 tab PO Q12H 10 Days Qty: 20 0RF    
  ondansetron 4 MG tablet,disintegrating     
   4 mg PO TIDP PRN (Reason: Nausea) Qty: 10 0RF    
  promethazine-DM 6.25-15 mg/5 mL syrup     
   5 ml PO Q6H PRN (Reason: cough) Qty: 118 0RF    
  azithromycin 250 mg tablet     
   See Rx Instructions  .ROUTE .COMPLEX Qty: 6 0RF    
   Rx Instructions:    
   For 250 mg dose pack: take 500 mg today (day 1), then 250 mg for 4 days (days  
2-5)    
  albuterol sulfate 90 mcg/actuation HFA aerosol inhaler     
   1 inh inhalation Q6H PRN (Reason: shortness of breath or wheezing) Qty: 8.5   
0RF    
  prednisone [prednisone] 20 mg tablet     
   20 mg PO BID Qty: 10 0RF    
    
Referrals    
Follow up/Referrals:    
AMBREEN Lira MD [Primary Care Provider] - See instructions    
Shannan Palomo DPM [Staff Physician] - See instructions    
    
Activity Restrictions/Add. Instructions    
Additional Instructions/Restrictions:    
Rest the extremity,  Elevate the extremity as tolerated while you are resting.    
    
Take ibuprofen for pain. I sent in a prescription to your pharmacy.    
    
Follow up with Dr. Palomo (podiatry). Since you are a diabetic we need to be   
very careful with your feet. Close follow up on an injury like this is very   
important. I put in a referral but you need to call her office and schedule an   
appointment.    
    
Follow up with your regular doctor.    
    
***GO TO THE ER FOR ANY WORSENING SYMPTOMS***    
    
    
Clinical Impressions    
Clinical Impression:    
 Puncture wound of foot, left, Need for Tdap vaccination, Diabetes    
    
    
Instructions    
Patient Instructions:  DI for Puncture Wound, Ceftriaxone Injection, Tetanus,   
Diphtheria, Pertussis (Tdap) Vaccine    
    
Print Language    
Print Language: English    
    
Discharge    
ED Provider: Zeke Moralez    
    
    
[facility] [place]    
General    
Stated complaint: L Foot pain ao stepped on object    
Time Seen by Provider: 09/17/24 13:29    
History of Present Illness    
Provider Complaint: He states that he was walking outside last night barefooted   
when he stepped on something sharp with his left foot. He has a puncture wound   
on the bottom of that foot. He is a diabetic.    
Related Data    
                                Home Medications    
    
    
    
?Medication ?Instructions ?Recorded ?Confirmed    
     
paroxetine HCl 12.5 mg 12.5 mg PO DAILY Depression 03/02/21 06/22/22    
    
tablet,extended release 24 hr       
     
bisoprolol fumarate 10 mg tablet 10 mg PO BID Hypertension 08/09/21 06/22/22    
     
meloxicam 7.5 mg tablet 7.5 mg PO DAILY FOOT 10/18/21 06/22/22    
     
aspirin 81 mg tablet,delayed 81 mg PO DAILY 11/19/21 06/22/22    
    
release       
    
    
                                  Previous Rx's    
    
    
    
?Medication ?Instructions ?Recorded    
     
amoxicillin 875 mg-potassium 1 tab PO Q12H 10 days #20 tabs 12/14/21    
    
clavulanate 125 mg tablet      
     
methylprednisolone 4 mg tablets in 4 mg PO AS DIRECTED 6 days #21 12/14/21    
    
a dose pack packets     
     
promethazine-DM 6.25 mg-15 mg/5 mL 5 ml PO Q6HP PRN Cough #240 mL 12/14/21    
    
oral syrup      
     
ondansetron 4 mg disintegrating 4 mg PO TIDP PRN Nausea #10 tabs 06/06/22    
    
tablet      
     
albuterol sulfate 90 mcg/actuation 1 inh inhalation Q6H PRN shortness 09/04/22    
    
aerosol inhaler of breath or wheezing #8.5 grams     
     
azithromycin 250 mg tablet See Rx Instructions PO .COMPLEX #6 09/04/22    
    
 tabs     
     
promethazine-DM 6.25 mg-15 mg/5 mL 5 ml PO Q6H PRN cough #118 mL 09/04/22    
    
oral syrup      
     
atorvastatin 40 mg tablet 40 mg PO DAILY Cholesterol #90 tabs 10/04/22    
     
prednisone 20 mg tablet 20 mg PO BID #10 tabs 12/11/22    
     
amlodipine 5 mg tablet See Rx Instructions .Route 12/19/22    
    
 .COMPLEX #90 tabs     
     
cephalexin 500 mg capsule 500 mg PO QID #40 caps 09/17/24    
     
mupirocin 2 % topical ointment 1 applic topical TID 7 days #15 09/17/24    
    
 grams     
    
    
    
                                    Allergies    
    
    
    
Allergy/AdvReac Type Severity Reaction Status Date / Time    
     
No Known Allergies Allergy   Verified 06/22/22 13:52    
    
    
    
    
[facility]    
Disclaimer:     
The information contained in this section may have been updated after the   
patient was seen, as this information can be updated by other users.    
    
Medical History (Reviewed 12/11/22 @ 11:49 by Eugonda Fryman ([facility]), APRN)    
    
Anxiety    
Asthma    
Depression    
Hyperlipidemia    
Hypertension    
Kidney stone    
    
    
Surgical History (Reviewed 12/11/22 @ 11:49 by Eugonda Fryman ([facility]), APRN)    
    
History of tonsillectomy    
    
    
Family History (Reviewed 12/11/22 @ 11:49 by Eugonda Fryman ([facility]), APRN)    
No significant family history    
    
    
Social History (Reviewed 12/11/22 @ 11:49 by Eugonda Fryman ([facility]), APRN)    
Smoking Status:  Unknown if ever smoked     
second hand exposure:  No     
alcohol intake:  never     
substance use type:  denies use     
current occupational status:  employed and other     
Travel in the last 8 weeks:  None     
household members:  spouse and children     
housing:  house     
current occupation:       
current occupational exposures/hazards:  No     
caffeine:  Yes     
    
    
    
ROS Obtained: Yes All systems reviewed & no additional complaints except as   
documented    
Constitutional    
Constitutional: Denies chills and Denies fever(s)    
Eyes    
Eyes: Denies eye discharge    
ENT    
Ears, Nose, Mouth, and Throat: Denies dizziness, Denies otalgia and Denies sore   
throat    
Cardiovascular    
Cardiovascular: Denies chest pain    
Respiratory    
Respiratory: Denies shortness of breath, Denies chest congestion, Denies cough,   
Denies stridor and Denies wheezing    
Gastrointestinal    
Gastrointestingal: Denies nausea or vomiting    
Musculoskeletal    
Musculoskeletal: Reports system reviewed and no additional complaints, except as  
documented and Denies arthralgias    
Integumentary/Breasts    
Skin/Breast: Reports as per HPI and Reports redness    
Neurologic    
Neurologic: Denies dizziness and Denies paresthesias    
Allergic/Immunologic    
Allergic/Immunologic: Denies wheezing    
    
Physical Exam    
General    
General appearance: alert and in no apparent distress    
Head    
Head exam: atraumatic, normocephalic and normal inspection    
Eye    
Eye exam: Present normal appearance, PERRL and EOMI    
ENT    
ENT exam: Present normal exam, normal oropharynx, mucous membranes moist, TM's   
normal bilaterally and normal external ear exam    
Neck    
Neck exam: Present normal inspection, full ROM and trachea midline; Absent   
meningismus or lymphadenopathy    
Chest    
Chest inspection: Present normal inspection and symmetric chest wall rise; A  
bsent tenderness    
Respiratory    
Respiratory exam: Present normal lung sounds bilaterally; Absent respiratory   
distress    
Cardiovascular    
Cardiovascular exam: Present regular rate and normal rhythm; Absent JVD    
Abdominal Exam    
Abdominal exam: Present soft and normal bowel sounds; Absent distention,   
tenderness or guarding    
Extremities Exam    
Extremities exam: Present normal inspection, full ROM and normal capillary   
refill; Absent calf tenderness    
Back Exam    
Back exam: Present normal inspection; Absent tenderness    
Neurological Exam    
Neurological exam: Present alert and oriented X3    
Psychiatric    
Psychiatric exam: Present normal affect and normal mood    
Skin    
Skin exam: Present other (there is a small puncture wound on the bottom of his   
left foot. there appears to be a splinter in the wound. there is mild redness   
that surrounds the wound. no drainage. )    
Lymphatic    
Lymphatic Findings: no adenopathy    
    
Medical Decision Making    
Medical Records    
Medical records reviewed: No I reviewed the patient's medical records.    
Screening:     
Per USPSTF and CDC recommendations, given the prevalence of disease in our   
region, it is our hospital?s policy to screen for HIV and viral Hepatitis for   
all patients aged 18 and over and those with ongoing risk factors.     
    
Mihir Inquiry    
Pt receiving controlled substance: No    
Radiology Data    
#1:     
      Image(s): Foot/Toes    
      Image Reviewed: Yes I reviewed the patient's radiology image and Yes I   
have reviewed radiologist's interpretation    
      Preliminary Findings: Normal/NAD    
      Accession No. : [id number]    
Patient Name / ID : Danielle Calix  / [id number]    
Exam Date : 09/17/2024 13:34:15 ( Final )    
Study Comment :     
Sex / Age : M  / 38Y    
    
Creator : ROBERT JOHNSON    
Dictator :     
 :     
Approver : ROBERT JOHNSON    
Approver2 :     
    
Report Date : 09/17/2024 15:11:04    
My Comment :     
*********************  
**************************************************************    
    
FINAL REPORT    
    
CLINICAL HISTORY:    
stepped on something    
    
COMPARISON:    
10/11/2021    
    
FINDINGS:    
Left foot  Three views were obtained.  There is no acute    
fracture or dislocation.  The joint spaces appear normal.  No    
soft tissue abnormality is identified.  There is a small plantar    
spur.    
    
IMPRESSION:    
No acute process.    
    
Reviewed, Interpreted and Dictated by Robert Johnson MD    
Transcribed by Tamica Terrazas    
    
Authenticated and Electronically Signed by Robert Johnson MD    
on 09/17/2024 03:11:04 PM [facility]    
    
Procedures    
Risk/Benefits of Procedure(s)    
Were Explained: Yes    
Foreign Body Removal    
Time Out Performed: Yes    
Site: left and foot    
Description of foreign body: other (splinter)    
Sedation/Analgesia: none    
Technique: incision made to facilitate removal    
Confirmed by:: direct visualization    
Complications: none    
Post-procedure exam: awake, alert, normal BP, normal HR and normal O2 sat    
Neurovascular: normal distal pulse, normal capillary fill, distal light touch   
sensation intact, distal motor function normal, no signs of compartment syndrome  
and no change from pre-procedure (He tolerated this well. the splinter was   
removed easily after the skin was scraped off above it. all the splinter was   
removed)

## 2024-09-17 NOTE — EXP.UTC
Discharge Plan
Disposition
Patient Disposition: Home, Self-Care

Condition: Good

Prescriptions
Prescriptions:
New
  cephalexin 500 mg capsule 
   500 mg PO QID Qty: 40 0RF
  mupirocin 2 % ointment 
   1 applic topical TID 7 Days Qty: 15 0RF

No Action
  meloxicam 7.5 mg tablet 
   7.5 mg PO DAILY 
  paroxetine HCl 12.5 mg tablet extended release 24 hr 
   12.5 mg PO DAILY 
   Patient Comments:
   TAKE 1 TABLET BY MOUTH ONCE DAILY IN THE MORNING FOR 30 DAYS
  aspirin 81 mg tablet,delayed release (DR/EC) 
   81 mg PO DAILY 
  atorvastatin 40 mg tablet 
   40 mg PO DAILY Qty: 90 3RF
  amlodipine 5 mg tablet 
   See Rx Instructions  .ROUTE .COMPLEX Qty: 90 1RF
   Dose Instruction:
   Take 1 tablet by mouth once daily
   Rx Instructions:
   Take 1 tablet by mouth once daily
  bisoprolol fumarate 10 MG tablet 
   10 mg PO BID 
  promethazine- ML syrup 
   5 ml PO Q6HP PRN (Reason: Cough) Qty: 240 0RF
  methylprednisolone 4 MG tablets,dose pack 
   4 mg PO AS DIRECTED 6 Days Qty: 21 0RF
  amoxicillin-pot clavulanate 1 EACH tablet 
   1 tab PO Q12H 10 Days Qty: 20 0RF
  ondansetron 4 MG tablet,disintegrating 
   4 mg PO TIDP PRN (Reason: Nausea) Qty: 10 0RF
  promethazine-DM 6.25-15 mg/5 mL syrup 
   5 ml PO Q6H PRN (Reason: cough) Qty: 118 0RF
  azithromycin 250 mg tablet 
   See Rx Instructions  .ROUTE .COMPLEX Qty: 6 0RF
   Rx Instructions:
   For 250 mg dose pack: take 500 mg today (day 1), then 250 mg for 4 days (days 2-5)
  albuterol sulfate 90 mcg/actuation HFA aerosol inhaler 
   1 inh inhalation Q6H PRN (Reason: shortness of breath or wheezing) Qty: 8.5 0RF
  prednisone [prednisone] 20 mg tablet 
   20 mg PO BID Qty: 10 0RF

Referrals
Follow up/Referrals:
AMBREEN Lira MD [Primary Care Provider] - See instructions
Shannan Palomo DPM [Staff Physician] - See instructions

Activity Restrictions/Add. Instructions
Additional Instructions/Restrictions:
Rest the extremity,  Elevate the extremity as tolerated while you are resting.

Take ibuprofen for pain. I sent in a prescription to your pharmacy.

Follow up with Dr. Palomo (podiatry). Since you are a diabetic we need to be very careful with your feet. Close follow up on an injury like this is very important. I put in a referral but you need to call her office and schedule an appointment.

Follow up with your regular doctor.

***GO TO THE ER FOR ANY WORSENING SYMPTOMS***


Clinical Impressions
Clinical Impression:
 Puncture wound of foot, left, Need for Tdap vaccination, Diabetes


Instructions
Patient Instructions:  DI for Puncture Wound, Ceftriaxone Injection, Tetanus, Diphtheria, Pertussis (Tdap) Vaccine

Print Language
Print Language: English

Discharge
ED Provider: Zeke Moralez


[facility] [place] HPI
General
Stated complaint: L Foot pain ao stepped on object
Time Seen by Provider: 09/17/24 13:29
History of Present Illness
Provider Complaint: He states that he was walking outside last night barefooted when he stepped on something sharp with his left foot. He has a puncture wound on the bottom of that foot. He is a diabetic.
Related Data
Home Medications

?Medication ?Instructions ?Recorded ?Confirmed
paroxetine HCl 12.5 mg 12.5 mg PO DAILY Depression 03/02/21 06/22/22
tablet,extended release 24 hr   
bisoprolol fumarate 10 mg tablet 10 mg PO BID Hypertension 08/09/21 06/22/22
meloxicam 7.5 mg tablet 7.5 mg PO DAILY FOOT 10/18/21 06/22/22
aspirin 81 mg tablet,delayed 81 mg PO DAILY 11/19/21 06/22/22
release   

Previous Rx's

?Medication ?Instructions ?Recorded
amoxicillin 875 mg-potassium 1 tab PO Q12H 10 days #20 tabs 12/14/21
clavulanate 125 mg tablet  
methylprednisolone 4 mg tablets in 4 mg PO AS DIRECTED 6 days #21 12/14/21
a dose pack packets 
promethazine-DM 6.25 mg-15 mg/5 mL 5 ml PO Q6HP PRN Cough #240 mL 12/14/21
oral syrup  
ondansetron 4 mg disintegrating 4 mg PO TIDP PRN Nausea #10 tabs 06/06/22
tablet  
albuterol sulfate 90 mcg/actuation 1 inh inhalation Q6H PRN shortness 09/04/22
aerosol inhaler of breath or wheezing #8.5 grams 
azithromycin 250 mg tablet See Rx Instructions PO .COMPLEX #6 09/04/22
 tabs 
promethazine-DM 6.25 mg-15 mg/5 mL 5 ml PO Q6H PRN cough #118 mL 09/04/22
oral syrup  
atorvastatin 40 mg tablet 40 mg PO DAILY Cholesterol #90 tabs 10/04/22
prednisone 20 mg tablet 20 mg PO BID #10 tabs 12/11/22
amlodipine 5 mg tablet See Rx Instructions .Route 12/19/22
 .COMPLEX #90 tabs 
cephalexin 500 mg capsule 500 mg PO QID #40 caps 09/17/24
mupirocin 2 % topical ointment 1 applic topical TID 7 days #15 09/17/24
 grams 


Allergies

Allergy/AdvReac Type Severity Reaction Status Date / Time
No Known Allergies Allergy   Verified 06/22/22 13:52



[facility]
Disclaimer: 
The information contained in this section may have been updated after the patient was seen, as this information can be updated by other users.

Medical History (Reviewed 12/11/22 @ 11:49 by Eugonda Fryman ([facility]), APRN)

Anxiety
Asthma
Depression
Hyperlipidemia
Hypertension
Kidney stone


Surgical History (Reviewed 12/11/22 @ 11:49 by Eugonda Fryman ([facility]), APRN)

History of tonsillectomy


Family History (Reviewed 12/11/22 @ 11:49 by Eugonda Fryman ([facility]), APRN)
No significant family history


Social History (Reviewed 12/11/22 @ 11:49 by Eugonda Fryman ([facility]), APRN)
Smoking Status:  Unknown if ever smoked 
second hand exposure:  No 
alcohol intake:  never 
substance use type:  denies use 
current occupational status:  employed and other 
Travel in the last 8 weeks:  None 
household members:  spouse and children 
housing:  house 
current occupation:   
current occupational exposures/hazards:  No 
caffeine:  Yes 



ROS Obtained: Yes All systems reviewed & no additional complaints except as documented
Constitutional
Constitutional: Denies chills and Denies fever(s)
Eyes
Eyes: Denies eye discharge
ENT
Ears, Nose, Mouth, and Throat: Denies dizziness, Denies otalgia and Denies sore throat
Cardiovascular
Cardiovascular: Denies chest pain
Respiratory
Respiratory: Denies shortness of breath, Denies chest congestion, Denies cough, Denies stridor and Denies wheezing
Gastrointestinal
Gastrointestingal: Denies nausea or vomiting
Musculoskeletal
Musculoskeletal: Reports system reviewed and no additional complaints, except as documented and Denies arthralgias
Integumentary/Breasts
Skin/Breast: Reports as per HPI and Reports redness
Neurologic
Neurologic: Denies dizziness and Denies paresthesias
Allergic/Immunologic
Allergic/Immunologic: Denies wheezing

Physical Exam
General
General appearance: alert and in no apparent distress
Head
Head exam: atraumatic, normocephalic and normal inspection
Eye
Eye exam: Present normal appearance, PERRL and EOMI
ENT
ENT exam: Present normal exam, normal oropharynx, mucous membranes moist, TM's normal bilaterally and normal external ear exam
Neck
Neck exam: Present normal inspection, full ROM and trachea midline; Absent meningismus or lymphadenopathy
Chest
Chest inspection: Present normal inspection and symmetric chest wall rise; Absent tenderness
Respiratory
Respiratory exam: Present normal lung sounds bilaterally; Absent respiratory distress
Cardiovascular
Cardiovascular exam: Present regular rate and normal rhythm; Absent JVD
Abdominal Exam
Abdominal exam: Present soft and normal bowel sounds; Absent distention, tenderness or guarding
Extremities Exam
Extremities exam: Present normal inspection, full ROM and normal capillary refill; Absent calf tenderness
Back Exam
Back exam: Present normal inspection; Absent tenderness
Neurological Exam
Neurological exam: Present alert and oriented X3
Psychiatric
Psychiatric exam: Present normal affect and normal mood
Skin
Skin exam: Present other (there is a small puncture wound on the bottom of his left foot. there appears to be a splinter in the wound. there is mild redness that surrounds the wound. no drainage. )
Lymphatic
Lymphatic Findings: no adenopathy

Medical Decision Making
Medical Records
Medical records reviewed: No I reviewed the patient's medical records.
Screening: 
Per USPSTF and CDC recommendations, given the prevalence of disease in our region, it is our hospital?s policy to screen for HIV and viral Hepatitis for all patients aged 18 and over and those with ongoing risk factors. 

Mihir Inquiry
Pt receiving controlled substance: No
Radiology Data
#1: 
      Image(s): Foot/Toes
      Image Reviewed: Yes I reviewed the patient's radiology image and Yes I have reviewed radiologist's interpretation
      Preliminary Findings: Normal/NAD
      Accession No. : [id number]
Patient Name / ID : Danielle Calix  / [id number]
Exam Date : 09/17/2024 13:34:15 ( Final )
Study Comment : 
Sex / Age : M  / 38Y

Creator : ROBERT JOHNSON
Dictator : 
 : 
Approver : ROBERT JOHNSON
Approver2 : 

Report Date : 09/17/2024 15:11:04
My Comment : 
***********************************************************************************

FINAL REPORT

CLINICAL HISTORY:
stepped on something

COMPARISON:
10/11/2021

FINDINGS:
Left foot  Three views were obtained.  There is no acute
fracture or dislocation.  The joint spaces appear normal.  No
soft tissue abnormality is identified.  There is a small plantar
spur.

IMPRESSION:
No acute process.

Reviewed, Interpreted and Dictated by Robert Jhonson MD
Transcribed by Tamica Terrazas

Authenticated and Electronically Signed by Robert Johnson MD
on 09/17/2024 03:11:04 PM [facility]

Procedures
Risk/Benefits of Procedure(s)
Were Explained: Yes
Foreign Body Removal
Time Out Performed: Yes
Site: left and foot
Description of foreign body: other (splinter)
Sedation/Analgesia: none
Technique: incision made to facilitate removal
Confirmed by:: direct visualization
Complications: none
Post-procedure exam: awake, alert, normal BP, normal HR and normal O2 sat
Neurovascular: normal distal pulse, normal capillary fill, distal light touch sensation intact, distal motor function normal, no signs of compartment syndrome and no change from pre-procedure (He tolerated this well. the splinter was removed easily 
after the skin was scraped off above it. all the splinter was removed)

## 2024-09-17 NOTE — XR_ITS
FINAL REPORT 
 
CLINICAL HISTORY: 
stepped on something 
 
COMPARISON: 
10/11/2021 
 
FINDINGS: 
Left foot  Three views were obtained.  There is no acute 
fracture or dislocation.  The joint spaces appear normal.  No 
soft tissue abnormality is identified.  There is a small plantar 
spur. 
 
IMPRESSION: 
No acute process. 
 
Reviewed, Interpreted and Dictated by Robert Johnson MD 
Transcribed by Tamica Terrazas 
 
Authenticated and Electronically Signed by Robert Johnson MD 
on 09/17/2024 03:11:04 PM [facility]

## 2025-01-10 ENCOUNTER — HOSPITAL ENCOUNTER (OUTPATIENT)
Dept: HOSPITAL 22 - RAD | Age: 40
Discharge: HOME | End: 2025-01-10
Payer: COMMERCIAL

## 2025-01-10 DIAGNOSIS — M79.605: Primary | ICD-10-CM

## 2025-01-10 PROCEDURE — 76882 US LMTD JT/FCL EVL NVASC XTR: CPT

## 2025-01-10 NOTE — US_ITS
FINAL REPORT 
 
CLINICAL HISTORY: 
LEFT LEG PAIN-- upper thigh 
 
FINDINGS: 
ULTRASOUND SOFT TISSUES OF THE LEFT UPPER THIGH  TECHNIQUE: 
Limited sonographic imaging of the soft tissues of the left 
upper thigh were obtained.  FINDINGS: No masses identified. 
There is no lymphadenopathy.  No fluid collection is identified. 
 
IMPRESSION: 
No abnormality visualized at the area of clinical interest. 
 
Reviewed, Interpreted and Dictated by Robert Johnson MD 
Transcribed by Heaven Delgadillo 
 
Authenticated and Electronically Signed by Robert Johnson MD 
on 01/10/2025 03:33:02 PM [facility]